# Patient Record
Sex: FEMALE | Race: WHITE | NOT HISPANIC OR LATINO | URBAN - METROPOLITAN AREA
[De-identification: names, ages, dates, MRNs, and addresses within clinical notes are randomized per-mention and may not be internally consistent; named-entity substitution may affect disease eponyms.]

---

## 2021-01-05 ENCOUNTER — LAB (OUTPATIENT)
Dept: LAB | Facility: HOSPITAL | Age: 61
End: 2021-01-05

## 2021-01-05 DIAGNOSIS — Z00.00 ROUTINE GENERAL MEDICAL EXAMINATION AT A HEALTH CARE FACILITY: Primary | ICD-10-CM

## 2021-01-05 LAB
ALBUMIN SERPL-MCNC: 4.2 G/DL (ref 3.5–5.2)
ALBUMIN/GLOB SERPL: 1.7 G/DL
ALP SERPL-CCNC: 93 U/L (ref 39–117)
ALT SERPL W P-5'-P-CCNC: 14 U/L (ref 1–33)
ANION GAP SERPL CALCULATED.3IONS-SCNC: 8.7 MMOL/L (ref 5–15)
AST SERPL-CCNC: 14 U/L (ref 1–32)
BILIRUB SERPL-MCNC: 0.4 MG/DL (ref 0–1.2)
BUN SERPL-MCNC: 13 MG/DL (ref 8–23)
BUN/CREAT SERPL: 27.1 (ref 7–25)
CALCIUM SPEC-SCNC: 9.2 MG/DL (ref 8.6–10.5)
CHLORIDE SERPL-SCNC: 104 MMOL/L (ref 98–107)
CHOLEST SERPL-MCNC: 203 MG/DL (ref 0–200)
CO2 SERPL-SCNC: 25.3 MMOL/L (ref 22–29)
CREAT SERPL-MCNC: 0.48 MG/DL (ref 0.57–1)
GFR SERPL CREATININE-BSD FRML MDRD: 132 ML/MIN/1.73
GLOBULIN UR ELPH-MCNC: 2.5 GM/DL
GLUCOSE SERPL-MCNC: 95 MG/DL (ref 65–99)
HDLC SERPL-MCNC: 26 MG/DL (ref 40–60)
LDLC SERPL CALC-MCNC: 101 MG/DL (ref 0–100)
LDLC/HDLC SERPL: 3.38 {RATIO}
POTASSIUM SERPL-SCNC: 4.1 MMOL/L (ref 3.5–5.2)
PROT SERPL-MCNC: 6.7 G/DL (ref 6–8.5)
SODIUM SERPL-SCNC: 138 MMOL/L (ref 136–145)
TRIGL SERPL-MCNC: 445 MG/DL (ref 0–150)
VLDLC SERPL-MCNC: 76 MG/DL (ref 5–40)

## 2021-01-05 PROCEDURE — 80053 COMPREHEN METABOLIC PANEL: CPT

## 2021-01-05 PROCEDURE — 36415 COLL VENOUS BLD VENIPUNCTURE: CPT

## 2021-01-05 PROCEDURE — 80061 LIPID PANEL: CPT

## 2021-07-21 ENCOUNTER — LAB (OUTPATIENT)
Dept: LAB | Facility: HOSPITAL | Age: 61
End: 2021-07-21

## 2021-07-21 ENCOUNTER — TRANSCRIBE ORDERS (OUTPATIENT)
Dept: LAB | Facility: HOSPITAL | Age: 61
End: 2021-07-21

## 2021-07-21 DIAGNOSIS — E78.5 HYPERLIPIDEMIA, UNSPECIFIED HYPERLIPIDEMIA TYPE: ICD-10-CM

## 2021-07-21 DIAGNOSIS — E78.5 HYPERLIPIDEMIA, UNSPECIFIED HYPERLIPIDEMIA TYPE: Primary | ICD-10-CM

## 2021-07-21 PROCEDURE — 36415 COLL VENOUS BLD VENIPUNCTURE: CPT

## 2021-08-03 ENCOUNTER — LAB (OUTPATIENT)
Dept: LAB | Facility: HOSPITAL | Age: 61
End: 2021-08-03

## 2021-08-03 ENCOUNTER — TRANSCRIBE ORDERS (OUTPATIENT)
Dept: LAB | Facility: HOSPITAL | Age: 61
End: 2021-08-03

## 2021-08-03 DIAGNOSIS — E78.5 HYPERLIPIDEMIA, UNSPECIFIED HYPERLIPIDEMIA TYPE: ICD-10-CM

## 2021-08-03 DIAGNOSIS — E78.5 HYPERLIPIDEMIA, UNSPECIFIED HYPERLIPIDEMIA TYPE: Primary | ICD-10-CM

## 2021-08-03 PROCEDURE — 36415 COLL VENOUS BLD VENIPUNCTURE: CPT

## 2021-08-03 PROCEDURE — 80061 LIPID PANEL: CPT

## 2021-08-03 PROCEDURE — 80053 COMPREHEN METABOLIC PANEL: CPT

## 2021-08-04 LAB
ALBUMIN SERPL-MCNC: 4.2 G/DL (ref 3.5–5.2)
ALBUMIN/GLOB SERPL: 1.5 G/DL
ALP SERPL-CCNC: 89 U/L (ref 39–117)
ALT SERPL W P-5'-P-CCNC: 10 U/L (ref 1–33)
ANION GAP SERPL CALCULATED.3IONS-SCNC: 9.7 MMOL/L (ref 5–15)
AST SERPL-CCNC: 11 U/L (ref 1–32)
BILIRUB SERPL-MCNC: 0.4 MG/DL (ref 0–1.2)
BUN SERPL-MCNC: 13 MG/DL (ref 8–23)
BUN/CREAT SERPL: 22 (ref 7–25)
CALCIUM SPEC-SCNC: 9.1 MG/DL (ref 8.6–10.5)
CHLORIDE SERPL-SCNC: 103 MMOL/L (ref 98–107)
CHOLEST SERPL-MCNC: 203 MG/DL (ref 0–200)
CO2 SERPL-SCNC: 26.3 MMOL/L (ref 22–29)
CREAT SERPL-MCNC: 0.59 MG/DL (ref 0.57–1)
GFR SERPL CREATININE-BSD FRML MDRD: 104 ML/MIN/1.73
GLOBULIN UR ELPH-MCNC: 2.8 GM/DL
GLUCOSE SERPL-MCNC: 96 MG/DL (ref 65–99)
HDLC SERPL-MCNC: 27 MG/DL (ref 40–60)
LDLC SERPL CALC-MCNC: 119 MG/DL (ref 0–100)
LDLC/HDLC SERPL: 4.1 {RATIO}
POTASSIUM SERPL-SCNC: 4.2 MMOL/L (ref 3.5–5.2)
PROT SERPL-MCNC: 7 G/DL (ref 6–8.5)
SODIUM SERPL-SCNC: 139 MMOL/L (ref 136–145)
TRIGL SERPL-MCNC: 326 MG/DL (ref 0–150)
VLDLC SERPL-MCNC: 57 MG/DL (ref 5–40)

## 2022-01-27 ENCOUNTER — LAB (OUTPATIENT)
Dept: LAB | Facility: HOSPITAL | Age: 62
End: 2022-01-27

## 2022-01-27 ENCOUNTER — TRANSCRIBE ORDERS (OUTPATIENT)
Dept: LAB | Facility: HOSPITAL | Age: 62
End: 2022-01-27

## 2022-01-27 DIAGNOSIS — E78.5 HYPERLIPIDEMIA, UNSPECIFIED HYPERLIPIDEMIA TYPE: Primary | ICD-10-CM

## 2022-01-27 DIAGNOSIS — E78.5 HYPERLIPIDEMIA, UNSPECIFIED HYPERLIPIDEMIA TYPE: ICD-10-CM

## 2022-01-27 PROCEDURE — 80061 LIPID PANEL: CPT

## 2022-01-27 PROCEDURE — 36415 COLL VENOUS BLD VENIPUNCTURE: CPT

## 2022-01-27 PROCEDURE — 80053 COMPREHEN METABOLIC PANEL: CPT

## 2022-01-28 LAB
ALBUMIN SERPL-MCNC: 4.1 G/DL (ref 3.5–5.2)
ALBUMIN/GLOB SERPL: 1.4 G/DL
ALP SERPL-CCNC: 106 U/L (ref 39–117)
ALT SERPL W P-5'-P-CCNC: 6 U/L (ref 1–33)
ANION GAP SERPL CALCULATED.3IONS-SCNC: 11 MMOL/L (ref 5–15)
AST SERPL-CCNC: 8 U/L (ref 1–32)
BILIRUB SERPL-MCNC: 0.3 MG/DL (ref 0–1.2)
BUN SERPL-MCNC: 12 MG/DL (ref 8–23)
BUN/CREAT SERPL: 28.6 (ref 7–25)
CALCIUM SPEC-SCNC: 9.2 MG/DL (ref 8.6–10.5)
CHLORIDE SERPL-SCNC: 100 MMOL/L (ref 98–107)
CHOLEST SERPL-MCNC: 206 MG/DL (ref 0–200)
CO2 SERPL-SCNC: 26 MMOL/L (ref 22–29)
CREAT SERPL-MCNC: 0.42 MG/DL (ref 0.57–1)
GFR SERPL CREATININE-BSD FRML MDRD: >150 ML/MIN/1.73
GLOBULIN UR ELPH-MCNC: 2.9 GM/DL
GLUCOSE SERPL-MCNC: 98 MG/DL (ref 65–99)
HDLC SERPL-MCNC: 26 MG/DL (ref 40–60)
LDLC SERPL CALC-MCNC: 116 MG/DL (ref 0–100)
LDLC/HDLC SERPL: 4.11 {RATIO}
POTASSIUM SERPL-SCNC: 4.1 MMOL/L (ref 3.5–5.2)
PROT SERPL-MCNC: 7 G/DL (ref 6–8.5)
SODIUM SERPL-SCNC: 137 MMOL/L (ref 136–145)
TRIGL SERPL-MCNC: 366 MG/DL (ref 0–150)
VLDLC SERPL-MCNC: 64 MG/DL (ref 5–40)

## 2023-11-19 ENCOUNTER — APPOINTMENT (OUTPATIENT)
Dept: GENERAL RADIOLOGY | Facility: HOSPITAL | Age: 63
End: 2023-11-19
Payer: COMMERCIAL

## 2023-11-19 ENCOUNTER — HOSPITAL ENCOUNTER (INPATIENT)
Facility: HOSPITAL | Age: 63
LOS: 2 days | Discharge: HOME | End: 2023-11-21
Attending: INTERNAL MEDICINE | Admitting: INTERNAL MEDICINE
Payer: COMMERCIAL

## 2023-11-19 ENCOUNTER — APPOINTMENT (OUTPATIENT)
Dept: CARDIOLOGY | Facility: HOSPITAL | Age: 63
End: 2023-11-19
Payer: COMMERCIAL

## 2023-11-19 PROBLEM — I10 PRIMARY HYPERTENSION: Status: ACTIVE | Noted: 2023-11-19

## 2023-11-19 PROBLEM — F41.9 ANXIETY: Status: ACTIVE | Noted: 2023-11-19

## 2023-11-19 PROBLEM — I25.708 CORONARY ARTERY DISEASE OF BYPASS GRAFT OF NATIVE HEART WITH STABLE ANGINA PECTORIS: Status: ACTIVE | Noted: 2023-11-19

## 2023-11-19 PROBLEM — I25.10 CAD (CORONARY ARTERY DISEASE), NATIVE CORONARY ARTERY: Status: ACTIVE | Noted: 2023-11-19

## 2023-11-19 PROBLEM — R19.7 ACUTE DIARRHEA: Status: ACTIVE | Noted: 2023-11-19

## 2023-11-19 PROBLEM — Z72.0 TOBACCO ABUSE: Status: ACTIVE | Noted: 2023-11-19

## 2023-11-19 PROBLEM — E78.2 MIXED HYPERLIPIDEMIA: Status: ACTIVE | Noted: 2023-11-19

## 2023-11-19 LAB
ALBUMIN SERPL-MCNC: 3.8 G/DL (ref 3.5–5.2)
ALBUMIN/GLOB SERPL: 1.3 G/DL
ALP SERPL-CCNC: 103 U/L (ref 39–117)
ALT SERPL W P-5'-P-CCNC: 9 U/L (ref 1–33)
ANION GAP SERPL CALCULATED.3IONS-SCNC: 12 MMOL/L (ref 5–15)
ANION GAP SERPL CALCULATED.3IONS-SCNC: 15 MMOL/L (ref 5–15)
APTT PPP: 164.8 SECONDS (ref 60–90)
AST SERPL-CCNC: 12 U/L (ref 1–32)
B PARAPERT DNA SPEC QL NAA+PROBE: NOT DETECTED
B PERT DNA SPEC QL NAA+PROBE: NOT DETECTED
BASOPHILS # BLD AUTO: 0.05 10*3/MM3 (ref 0–0.2)
BASOPHILS NFR BLD AUTO: 0.4 % (ref 0–1.5)
BILIRUB SERPL-MCNC: 0.6 MG/DL (ref 0–1.2)
BUN SERPL-MCNC: 10 MG/DL (ref 8–23)
BUN SERPL-MCNC: 11 MG/DL (ref 8–23)
BUN/CREAT SERPL: 18.5 (ref 7–25)
BUN/CREAT SERPL: 18.6 (ref 7–25)
C PNEUM DNA NPH QL NAA+NON-PROBE: NOT DETECTED
CALCIUM SPEC-SCNC: 9 MG/DL (ref 8.6–10.5)
CALCIUM SPEC-SCNC: 9.1 MG/DL (ref 8.6–10.5)
CHLORIDE SERPL-SCNC: 100 MMOL/L (ref 98–107)
CHLORIDE SERPL-SCNC: 101 MMOL/L (ref 98–107)
CHOLEST SERPL-MCNC: 195 MG/DL (ref 0–200)
CO2 SERPL-SCNC: 23 MMOL/L (ref 22–29)
CO2 SERPL-SCNC: 27 MMOL/L (ref 22–29)
CREAT SERPL-MCNC: 0.54 MG/DL (ref 0.57–1)
CREAT SERPL-MCNC: 0.59 MG/DL (ref 0.57–1)
DEPRECATED RDW RBC AUTO: 44.8 FL (ref 37–54)
EGFRCR SERPLBLD CKD-EPI 2021: 101.4 ML/MIN/1.73
EGFRCR SERPLBLD CKD-EPI 2021: 103.6 ML/MIN/1.73
EOSINOPHIL # BLD AUTO: 0.05 10*3/MM3 (ref 0–0.4)
EOSINOPHIL NFR BLD AUTO: 0.4 % (ref 0.3–6.2)
ERYTHROCYTE [DISTWIDTH] IN BLOOD BY AUTOMATED COUNT: 13.7 % (ref 12.3–15.4)
FLUAV SUBTYP SPEC NAA+PROBE: NOT DETECTED
FLUBV RNA ISLT QL NAA+PROBE: NOT DETECTED
GEN 5 2HR TROPONIN T REFLEX: 9 NG/L
GLOBULIN UR ELPH-MCNC: 3 GM/DL
GLUCOSE BLDC GLUCOMTR-MCNC: 109 MG/DL (ref 70–130)
GLUCOSE BLDC GLUCOMTR-MCNC: 131 MG/DL (ref 70–130)
GLUCOSE SERPL-MCNC: 168 MG/DL (ref 65–99)
GLUCOSE SERPL-MCNC: 239 MG/DL (ref 65–99)
HADV DNA SPEC NAA+PROBE: NOT DETECTED
HCOV 229E RNA SPEC QL NAA+PROBE: NOT DETECTED
HCOV HKU1 RNA SPEC QL NAA+PROBE: NOT DETECTED
HCOV NL63 RNA SPEC QL NAA+PROBE: NOT DETECTED
HCOV OC43 RNA SPEC QL NAA+PROBE: NOT DETECTED
HCT VFR BLD AUTO: 44.8 % (ref 34–46.6)
HDLC SERPL-MCNC: 29 MG/DL (ref 40–60)
HGB BLD-MCNC: 14.7 G/DL (ref 12–15.9)
HMPV RNA NPH QL NAA+NON-PROBE: NOT DETECTED
HPIV1 RNA ISLT QL NAA+PROBE: NOT DETECTED
HPIV2 RNA SPEC QL NAA+PROBE: NOT DETECTED
HPIV3 RNA NPH QL NAA+PROBE: NOT DETECTED
HPIV4 P GENE NPH QL NAA+PROBE: NOT DETECTED
IMM GRANULOCYTES # BLD AUTO: 0.04 10*3/MM3 (ref 0–0.05)
IMM GRANULOCYTES NFR BLD AUTO: 0.3 % (ref 0–0.5)
INR PPP: 1.14 (ref 0.89–1.12)
LDLC SERPL CALC-MCNC: 140 MG/DL (ref 0–100)
LDLC/HDLC SERPL: 4.74 {RATIO}
LYMPHOCYTES # BLD AUTO: 2.33 10*3/MM3 (ref 0.7–3.1)
LYMPHOCYTES NFR BLD AUTO: 17.2 % (ref 19.6–45.3)
M PNEUMO IGG SER IA-ACNC: NOT DETECTED
MAGNESIUM SERPL-MCNC: 1.7 MG/DL (ref 1.6–2.4)
MCH RBC QN AUTO: 29.5 PG (ref 26.6–33)
MCHC RBC AUTO-ENTMCNC: 32.8 G/DL (ref 31.5–35.7)
MCV RBC AUTO: 90 FL (ref 79–97)
MONOCYTES # BLD AUTO: 0.4 10*3/MM3 (ref 0.1–0.9)
MONOCYTES NFR BLD AUTO: 2.9 % (ref 5–12)
NEUTROPHILS NFR BLD AUTO: 10.69 10*3/MM3 (ref 1.7–7)
NEUTROPHILS NFR BLD AUTO: 78.8 % (ref 42.7–76)
NRBC BLD AUTO-RTO: 0 /100 WBC (ref 0–0.2)
PLATELET # BLD AUTO: 262 10*3/MM3 (ref 140–450)
PMV BLD AUTO: 12.2 FL (ref 6–12)
POTASSIUM SERPL-SCNC: 2.7 MMOL/L (ref 3.5–5.2)
POTASSIUM SERPL-SCNC: 3.3 MMOL/L (ref 3.5–5.2)
PROT SERPL-MCNC: 6.8 G/DL (ref 6–8.5)
PROTHROMBIN TIME: 14.8 SECONDS (ref 12.2–14.5)
RBC # BLD AUTO: 4.98 10*6/MM3 (ref 3.77–5.28)
RHINOVIRUS RNA SPEC NAA+PROBE: NOT DETECTED
RSV RNA NPH QL NAA+NON-PROBE: NOT DETECTED
SARS-COV-2 RNA NPH QL NAA+NON-PROBE: NOT DETECTED
SODIUM SERPL-SCNC: 139 MMOL/L (ref 136–145)
SODIUM SERPL-SCNC: 139 MMOL/L (ref 136–145)
TRIGL SERPL-MCNC: 143 MG/DL (ref 0–150)
TROPONIN T DELTA: 3 NG/L
TROPONIN T SERPL HS-MCNC: 6 NG/L
VLDLC SERPL-MCNC: 26 MG/DL (ref 5–40)
WBC NRBC COR # BLD AUTO: 13.56 10*3/MM3 (ref 3.4–10.8)

## 2023-11-19 PROCEDURE — C1769 GUIDE WIRE: HCPCS | Performed by: INTERNAL MEDICINE

## 2023-11-19 PROCEDURE — 93306 TTE W/DOPPLER COMPLETE: CPT | Performed by: INTERNAL MEDICINE

## 2023-11-19 PROCEDURE — C1887 CATHETER, GUIDING: HCPCS | Performed by: INTERNAL MEDICINE

## 2023-11-19 PROCEDURE — C1725 CATH, TRANSLUMIN NON-LASER: HCPCS | Performed by: INTERNAL MEDICINE

## 2023-11-19 PROCEDURE — 80061 LIPID PANEL: CPT | Performed by: INTERNAL MEDICINE

## 2023-11-19 PROCEDURE — 25510000001 IOPAMIDOL PER 1 ML: Performed by: INTERNAL MEDICINE

## 2023-11-19 PROCEDURE — 027034Z DILATION OF CORONARY ARTERY, ONE ARTERY WITH DRUG-ELUTING INTRALUMINAL DEVICE, PERCUTANEOUS APPROACH: ICD-10-PCS | Performed by: INTERNAL MEDICINE

## 2023-11-19 PROCEDURE — 93458 L HRT ARTERY/VENTRICLE ANGIO: CPT | Performed by: INTERNAL MEDICINE

## 2023-11-19 PROCEDURE — 84132 ASSAY OF SERUM POTASSIUM: CPT | Performed by: NURSE PRACTITIONER

## 2023-11-19 PROCEDURE — 80053 COMPREHEN METABOLIC PANEL: CPT | Performed by: INTERNAL MEDICINE

## 2023-11-19 PROCEDURE — 93005 ELECTROCARDIOGRAM TRACING: CPT | Performed by: INTERNAL MEDICINE

## 2023-11-19 PROCEDURE — 82948 REAGENT STRIP/BLOOD GLUCOSE: CPT

## 2023-11-19 PROCEDURE — 25010000002 MIDAZOLAM PER 1 MG: Performed by: INTERNAL MEDICINE

## 2023-11-19 PROCEDURE — 84484 ASSAY OF TROPONIN QUANT: CPT | Performed by: INTERNAL MEDICINE

## 2023-11-19 PROCEDURE — 99223 1ST HOSP IP/OBS HIGH 75: CPT | Performed by: INTERNAL MEDICINE

## 2023-11-19 PROCEDURE — 25010000002 LORAZEPAM PER 2 MG: Performed by: INTERNAL MEDICINE

## 2023-11-19 PROCEDURE — C9600 PERC DRUG-EL COR STENT SING: HCPCS | Performed by: INTERNAL MEDICINE

## 2023-11-19 PROCEDURE — 25010000002 FENTANYL CITRATE (PF) 50 MCG/ML SOLUTION: Performed by: INTERNAL MEDICINE

## 2023-11-19 PROCEDURE — 25810000003 SODIUM CHLORIDE 0.9 % SOLUTION: Performed by: INTERNAL MEDICINE

## 2023-11-19 PROCEDURE — 4A023N7 MEASUREMENT OF CARDIAC SAMPLING AND PRESSURE, LEFT HEART, PERCUTANEOUS APPROACH: ICD-10-PCS | Performed by: INTERNAL MEDICINE

## 2023-11-19 PROCEDURE — C1894 INTRO/SHEATH, NON-LASER: HCPCS | Performed by: INTERNAL MEDICINE

## 2023-11-19 PROCEDURE — 25810000003 SODIUM CHLORIDE 0.9 % SOLUTION: Performed by: NURSE PRACTITIONER

## 2023-11-19 PROCEDURE — 85025 COMPLETE CBC W/AUTO DIFF WBC: CPT | Performed by: INTERNAL MEDICINE

## 2023-11-19 PROCEDURE — 93306 TTE W/DOPPLER COMPLETE: CPT

## 2023-11-19 PROCEDURE — 25010000002 SULFUR HEXAFLUORIDE MICROSPH 60.7-25 MG RECONSTITUTED SUSPENSION: Performed by: INTERNAL MEDICINE

## 2023-11-19 PROCEDURE — 83735 ASSAY OF MAGNESIUM: CPT | Performed by: INTERNAL MEDICINE

## 2023-11-19 PROCEDURE — C1874 STENT, COATED/COV W/DEL SYS: HCPCS | Performed by: INTERNAL MEDICINE

## 2023-11-19 PROCEDURE — C1760 CLOSURE DEV, VASC: HCPCS | Performed by: INTERNAL MEDICINE

## 2023-11-19 PROCEDURE — 0202U NFCT DS 22 TRGT SARS-COV-2: CPT | Performed by: NURSE PRACTITIONER

## 2023-11-19 PROCEDURE — 25010000002 HEPARIN (PORCINE) PER 1000 UNITS: Performed by: INTERNAL MEDICINE

## 2023-11-19 PROCEDURE — 71045 X-RAY EXAM CHEST 1 VIEW: CPT

## 2023-11-19 PROCEDURE — B2111ZZ FLUOROSCOPY OF MULTIPLE CORONARY ARTERIES USING LOW OSMOLAR CONTRAST: ICD-10-PCS | Performed by: INTERNAL MEDICINE

## 2023-11-19 PROCEDURE — 99222 1ST HOSP IP/OBS MODERATE 55: CPT | Performed by: NURSE PRACTITIONER

## 2023-11-19 PROCEDURE — 92928 PRQ TCAT PLMT NTRAC ST 1 LES: CPT | Performed by: INTERNAL MEDICINE

## 2023-11-19 PROCEDURE — 85610 PROTHROMBIN TIME: CPT | Performed by: INTERNAL MEDICINE

## 2023-11-19 PROCEDURE — 85730 THROMBOPLASTIN TIME PARTIAL: CPT | Performed by: INTERNAL MEDICINE

## 2023-11-19 DEVICE — XIENCE SKYPOINT™ EVEROLIMUS ELUTING CORONARY STENT SYSTEM 4.00 MM X 23 MM / RAPID-EXCHANGE
Type: IMPLANTABLE DEVICE | Site: HEART | Status: FUNCTIONAL
Brand: XIENCE SKYPOINT™

## 2023-11-19 RX ORDER — SODIUM CHLORIDE 9 MG/ML
75 INJECTION, SOLUTION INTRAVENOUS CONTINUOUS
Status: DISCONTINUED | OUTPATIENT
Start: 2023-11-19 | End: 2023-11-21

## 2023-11-19 RX ORDER — NICARDIPINE HCL-0.9% SOD CHLOR 1 MG/10 ML
SYRINGE (ML) INTRAVENOUS
Status: DISCONTINUED | OUTPATIENT
Start: 2023-11-19 | End: 2023-11-19 | Stop reason: HOSPADM

## 2023-11-19 RX ORDER — MIDAZOLAM HYDROCHLORIDE 1 MG/ML
INJECTION INTRAMUSCULAR; INTRAVENOUS
Status: DISCONTINUED | OUTPATIENT
Start: 2023-11-19 | End: 2023-11-19 | Stop reason: HOSPADM

## 2023-11-19 RX ORDER — FENTANYL CITRATE 50 UG/ML
INJECTION, SOLUTION INTRAMUSCULAR; INTRAVENOUS
Status: DISCONTINUED | OUTPATIENT
Start: 2023-11-19 | End: 2023-11-19 | Stop reason: HOSPADM

## 2023-11-19 RX ORDER — METOPROLOL SUCCINATE 25 MG/1
25 TABLET, EXTENDED RELEASE ORAL DAILY
Status: ON HOLD | COMMUNITY
End: 2023-11-21 | Stop reason: SDUPTHER

## 2023-11-19 RX ORDER — HYDROXYZINE HYDROCHLORIDE 25 MG/1
25 TABLET, FILM COATED ORAL 3 TIMES DAILY PRN
Status: DISCONTINUED | OUTPATIENT
Start: 2023-11-19 | End: 2023-11-21 | Stop reason: HOSPADM

## 2023-11-19 RX ORDER — ACETAMINOPHEN 325 MG/1
650 TABLET ORAL EVERY 4 HOURS PRN
Status: DISCONTINUED | OUTPATIENT
Start: 2023-11-19 | End: 2023-11-21 | Stop reason: HOSPADM

## 2023-11-19 RX ORDER — INSULIN LISPRO 100 [IU]/ML
2-7 INJECTION, SOLUTION INTRAVENOUS; SUBCUTANEOUS
Status: DISCONTINUED | OUTPATIENT
Start: 2023-11-19 | End: 2023-11-21 | Stop reason: HOSPADM

## 2023-11-19 RX ORDER — CLOPIDOGREL BISULFATE 75 MG/1
75 TABLET ORAL DAILY
Status: DISCONTINUED | OUTPATIENT
Start: 2023-11-20 | End: 2023-11-21 | Stop reason: HOSPADM

## 2023-11-19 RX ORDER — LOSARTAN POTASSIUM 25 MG/1
25 TABLET ORAL DAILY
Status: DISCONTINUED | OUTPATIENT
Start: 2023-11-19 | End: 2023-11-21 | Stop reason: HOSPADM

## 2023-11-19 RX ORDER — IPRATROPIUM BROMIDE AND ALBUTEROL SULFATE 2.5; .5 MG/3ML; MG/3ML
3 SOLUTION RESPIRATORY (INHALATION) EVERY 4 HOURS PRN
Status: DISCONTINUED | OUTPATIENT
Start: 2023-11-19 | End: 2023-11-21 | Stop reason: HOSPADM

## 2023-11-19 RX ORDER — ALUMINA, MAGNESIA, AND SIMETHICONE 2400; 2400; 240 MG/30ML; MG/30ML; MG/30ML
15 SUSPENSION ORAL EVERY 6 HOURS PRN
Status: DISCONTINUED | OUTPATIENT
Start: 2023-11-19 | End: 2023-11-21 | Stop reason: HOSPADM

## 2023-11-19 RX ORDER — LORAZEPAM 2 MG/ML
INJECTION INTRAMUSCULAR
Status: DISCONTINUED | OUTPATIENT
Start: 2023-11-19 | End: 2023-11-19 | Stop reason: HOSPADM

## 2023-11-19 RX ORDER — CYCLOBENZAPRINE HCL 10 MG
10 TABLET ORAL ONCE
Status: COMPLETED | OUTPATIENT
Start: 2023-11-19 | End: 2023-11-19

## 2023-11-19 RX ORDER — IBUPROFEN 600 MG/1
1 TABLET ORAL
Status: DISCONTINUED | OUTPATIENT
Start: 2023-11-19 | End: 2023-11-21 | Stop reason: HOSPADM

## 2023-11-19 RX ORDER — NITROGLYCERIN 0.4 MG/1
0.4 TABLET SUBLINGUAL
Status: DISCONTINUED | OUTPATIENT
Start: 2023-11-19 | End: 2023-11-21 | Stop reason: HOSPADM

## 2023-11-19 RX ORDER — NICOTINE 21 MG/24HR
1 PATCH, TRANSDERMAL 24 HOURS TRANSDERMAL DAILY
Status: DISCONTINUED | OUTPATIENT
Start: 2023-11-19 | End: 2023-11-21 | Stop reason: HOSPADM

## 2023-11-19 RX ORDER — ROSUVASTATIN CALCIUM 20 MG/1
20 TABLET, COATED ORAL NIGHTLY
Status: DISCONTINUED | OUTPATIENT
Start: 2023-11-19 | End: 2023-11-21 | Stop reason: HOSPADM

## 2023-11-19 RX ORDER — ALPRAZOLAM 0.5 MG/1
0.5 TABLET ORAL 3 TIMES DAILY PRN
Status: DISCONTINUED | OUTPATIENT
Start: 2023-11-19 | End: 2023-11-21 | Stop reason: HOSPADM

## 2023-11-19 RX ORDER — NICOTINE POLACRILEX 4 MG
15 LOZENGE BUCCAL
Status: DISCONTINUED | OUTPATIENT
Start: 2023-11-19 | End: 2023-11-21 | Stop reason: HOSPADM

## 2023-11-19 RX ORDER — HYDROCODONE BITARTRATE AND ACETAMINOPHEN 7.5; 325 MG/1; MG/1
1 TABLET ORAL EVERY 4 HOURS PRN
Status: DISCONTINUED | OUTPATIENT
Start: 2023-11-19 | End: 2023-11-21 | Stop reason: HOSPADM

## 2023-11-19 RX ORDER — DEXTROSE MONOHYDRATE 25 G/50ML
25 INJECTION, SOLUTION INTRAVENOUS
Status: DISCONTINUED | OUTPATIENT
Start: 2023-11-19 | End: 2023-11-21 | Stop reason: HOSPADM

## 2023-11-19 RX ORDER — ASPIRIN 81 MG/1
81 TABLET ORAL DAILY
Status: DISCONTINUED | OUTPATIENT
Start: 2023-11-20 | End: 2023-11-21 | Stop reason: HOSPADM

## 2023-11-19 RX ORDER — HEPARIN SODIUM 1000 [USP'U]/ML
INJECTION, SOLUTION INTRAVENOUS; SUBCUTANEOUS
Status: DISCONTINUED | OUTPATIENT
Start: 2023-11-19 | End: 2023-11-19 | Stop reason: HOSPADM

## 2023-11-19 RX ORDER — SODIUM CHLORIDE 9 MG/ML
3 INJECTION, SOLUTION INTRAVENOUS CONTINUOUS
Status: ACTIVE | OUTPATIENT
Start: 2023-11-19 | End: 2023-11-19

## 2023-11-19 RX ORDER — LIDOCAINE HYDROCHLORIDE 10 MG/ML
INJECTION, SOLUTION EPIDURAL; INFILTRATION; INTRACAUDAL; PERINEURAL
Status: DISCONTINUED | OUTPATIENT
Start: 2023-11-19 | End: 2023-11-19 | Stop reason: HOSPADM

## 2023-11-19 RX ORDER — ONDANSETRON 2 MG/ML
4 INJECTION INTRAMUSCULAR; INTRAVENOUS EVERY 6 HOURS PRN
Status: DISCONTINUED | OUTPATIENT
Start: 2023-11-19 | End: 2023-11-21 | Stop reason: HOSPADM

## 2023-11-19 RX ORDER — ONDANSETRON 4 MG/1
4 TABLET, FILM COATED ORAL EVERY 6 HOURS PRN
Status: DISCONTINUED | OUTPATIENT
Start: 2023-11-19 | End: 2023-11-21 | Stop reason: HOSPADM

## 2023-11-19 RX ORDER — POTASSIUM CHLORIDE 750 MG/1
40 CAPSULE, EXTENDED RELEASE ORAL EVERY 4 HOURS
Status: COMPLETED | OUTPATIENT
Start: 2023-11-19 | End: 2023-11-19

## 2023-11-19 RX ORDER — ALBUTEROL SULFATE 90 UG/1
2 AEROSOL, METERED RESPIRATORY (INHALATION) EVERY 4 HOURS PRN
COMMUNITY

## 2023-11-19 RX ORDER — CLOPIDOGREL BISULFATE 75 MG/1
TABLET ORAL
Status: DISCONTINUED | OUTPATIENT
Start: 2023-11-19 | End: 2023-11-19 | Stop reason: HOSPADM

## 2023-11-19 RX ADMIN — METOPROLOL TARTRATE 25 MG: 25 TABLET, FILM COATED ORAL at 21:02

## 2023-11-19 RX ADMIN — POTASSIUM CHLORIDE 40 MEQ: 750 CAPSULE, EXTENDED RELEASE ORAL at 17:35

## 2023-11-19 RX ADMIN — ALPRAZOLAM 0.5 MG: 0.5 TABLET ORAL at 14:29

## 2023-11-19 RX ADMIN — POTASSIUM CHLORIDE 40 MEQ: 750 CAPSULE, EXTENDED RELEASE ORAL at 12:25

## 2023-11-19 RX ADMIN — ALPRAZOLAM 0.5 MG: 0.5 TABLET ORAL at 22:34

## 2023-11-19 RX ADMIN — METOPROLOL TARTRATE 25 MG: 25 TABLET, FILM COATED ORAL at 12:24

## 2023-11-19 RX ADMIN — POTASSIUM CHLORIDE 40 MEQ: 750 CAPSULE, EXTENDED RELEASE ORAL at 21:02

## 2023-11-19 RX ADMIN — LOSARTAN POTASSIUM 25 MG: 25 TABLET, FILM COATED ORAL at 12:24

## 2023-11-19 RX ADMIN — Medication 1 PATCH: at 12:25

## 2023-11-19 RX ADMIN — SULFUR HEXAFLUORIDE 5 ML: KIT at 15:58

## 2023-11-19 RX ADMIN — SODIUM CHLORIDE 75 ML/HR: 9 INJECTION, SOLUTION INTRAVENOUS at 15:17

## 2023-11-19 RX ADMIN — CYCLOBENZAPRINE 10 MG: 10 TABLET, FILM COATED ORAL at 17:35

## 2023-11-19 RX ADMIN — ROSUVASTATIN 20 MG: 20 TABLET, FILM COATED ORAL at 21:02

## 2023-11-19 RX ADMIN — SODIUM CHLORIDE 3 ML/KG/HR: 9 INJECTION, SOLUTION INTRAVENOUS at 12:22

## 2023-11-19 NOTE — Clinical Note
First balloon inflation max pressure = 12 jed. First balloon inflation duration = 15 seconds. Second inflation of balloon - Max pressure = 12 jed. 2nd Inflation of balloon - Duration = 15 seconds. 2nd inflation was done at 10:15 EST.

## 2023-11-19 NOTE — CONSULTS
[facility] Medicine Services  CONSULT NOTE      Patient Name: Fatemeh Hwoard  : 1960  MRN: [id number]    Primary Care Physician: Provider, No Known  Provider requesting consultation: Delia Milelr MD    Subjective   Subjective     Reason for Consultation:  Diarrhea, leukocytosis     HPI:  Fatemeh Howard is a 63 y.o. female with past medical history significant for HTN, HLD, anxiety, and tobacco abuse who was transferred from [facility] on 2023 due to concern for STEMI. Upon arrival to [facility], she underwent emergent cardiac catheterization found to have  of the codominant RCA with collaterals, proximal codominant circumflex tandem 80% stenoses s/p PCI of the proximal circumflex with 1 drug eluting stent. Hospital medicine was consulted to assist with medical management.    Patient was seen resting in bed following heart catheterization. She reports a history of IBS with predominate diarrhea. She reports diarrhea has been worse for the past week since her chest started hurting. She denies any recent fever, chills, or abdominal pain. She does endorse increased anxiety. She denies known history of diabetes. No additional concerns at  this time.     Personal History     History reviewed. No pertinent past medical history.    History reviewed. No pertinent surgical history.    Family History: family history is not on file.     Social History:      Medications:  albuterol sulfate HFA and metoprolol succinate XL    Scheduled Meds:[START ON 2023] aspirin, 81 mg, Oral, Daily  [START ON 2023] clopidogrel, 75 mg, Oral, Daily  insulin lispro, 2-7 Units, Subcutaneous, 4x Daily AC & at Bedtime  losartan, 25 mg, Oral, Daily  metoprolol tartrate, 25 mg, Oral, Q12H  nicotine, 1 patch, Transdermal, Daily  pharmacy consult - [place], , Does not apply, Daily  potassium chloride, 40 mEq, Oral, Q4H  rosuvastatin, 20 mg, Oral, Nightly      Continuous Infusions:sodium chloride, 3 mL/kg/hr,  Last Rate: 3 mL/kg/hr (11/19/23 1222)      PRN Meds:.  acetaminophen    ALPRAZolam    aluminum-magnesium hydroxide-simethicone    Calcium Replacement - Follow Nurse / BPA Driven Protocol    dextrose    dextrose    glucagon (human recombinant)    HYDROcodone-acetaminophen    hydrOXYzine    Magnesium Standard Dose Replacement - Follow Nurse / BPA Driven Protocol    nicotine polacrilex    nitroglycerin    ondansetron **OR** ondansetron    Phosphorus Replacement - Follow Nurse / BPA Driven Protocol    Potassium Replacement - Follow Nurse / BPA Driven Protocol    No Known Allergies    Objective   Objective     Vital Signs:   Heart Rate:  [81-84] 84  Resp:  [18] 18  BP: (153-164)/() 161/106  Flow (L/min):  [4] 4    Physical Exam  Constitutional: Awake, alert, chronically ill appearing  Eyes: PERRLA, sclerae anicteric, no conjunctival injection  HENT: NCAT, mucous membranes moist  Neck: Supple, no thyromegaly, no lymphadenopathy, trachea midline  Respiratory: grossly clear, diminished in bases, nonlabored respirations on 4L NC  Cardiovascular: RRR, no murmurs, rubs, or gallops, palpable pedal pulses bilaterally  Gastrointestinal: Positive bowel sounds, soft, nontender, nondistended  Musculoskeletal: No bilateral ankle edema, no clubbing or cyanosis to extremities  Psychiatric: Anxious affect, cooperative  Neurologic: Oriented x 3, moves all extremities, Cranial Nerves grossly intact to confrontation, speech clear  Skin: No rashes, right radial site with no hematoma     Result Review:  I have personally reviewed the results from the time of admission and agree with these findings:  [x]  Laboratory  [x]  Radiology  []  EKG/Telemetry   []  Pathology  [x]  Old records  []  Other:  Most notable findings include:    LAB RESULTS:      Lab 11/19/23  1002   WBC 13.56*   HEMOGLOBIN 14.7   HEMATOCRIT 44.8   PLATELETS 262   NEUTROS ABS 10.69*   IMMATURE GRANS (ABS) 0.04   LYMPHS ABS 2.33   MONOS ABS 0.40   EOS ABS 0.05   MCV  90.0         Lab 11/19/23  1002   SODIUM 139   POTASSIUM 2.7*   CHLORIDE 101   CO2 23.0   ANION GAP 15.0   BUN 11   CREATININE 0.59   EGFR 101.4   GLUCOSE 239*   CALCIUM 9.0   MAGNESIUM 1.7         Lab 11/19/23  1002   TOTAL PROTEIN 6.8   ALBUMIN 3.8   GLOBULIN 3.0   ALT (SGPT) 9   AST (SGOT) 12   BILIRUBIN 0.6   ALK PHOS 103         Lab 11/19/23  1002   HSTROP T 6         Lab 11/19/23  1002   CHOLESTEROL 195   LDL CHOL 140*   HDL CHOL 29*   TRIGLYCERIDES 143             Brief Urine Lab Results       None          Microbiology Results (last 10 days)       ** No results found for the last 240 hours. **            Cardiac Catheterization/Vascular Study    Result Date: 11/19/2023    False STEMI, 100%  of the medium caliber codominant RCA with mature left-to-right collaterals   Codominant large circumflex tandem 80% stenoses of the proximal and mid status post PCI with a 4.0 x 23 mm Xience BERENICE.   Scattered mid to distal LAD 10-20%.  Normal left main.   LV pressures (S/D/E) : 138/-4/10 mmHg   The ejection fraction was greater than 55% by visual estimate.  With akinesis of the basal inferior segment.   Mild aortic stenosis may be present with a peak to peak gradient of 35 mmHg, we will check an echocardiogram Aspirin 81 mg for life, clopidogrel 75 mg daily for no less than 6 months          Assessment & Plan   Assessment & Plan     Active Hospital Problems    Diagnosis  POA    **Coronary artery disease of bypass graft of native heart with stable angina pectoris [I25.708]  Unknown    Anxiety [F41.9]  Unknown    Acute diarrhea [R19.7]  Unknown    Primary hypertension [I10]  Unknown    Tobacco abuse [Z72.0]  Unknown    Mixed hyperlipidemia [E78.2]  Unknown    CAD (coronary artery disease), native coronary artery [I25.10]  Yes      Resolved Hospital Problems   No resolved problems to display.     Fatemeh Howard is a 63 y.o. female with past medical history significant for HTN, HLD, anxiety, and tobacco abuse who was  transferred from [facility] on 11/19/2023 due to concern for STEMI. Hospital medicine was consulted to assist with medical management.     CAD  -s/p LHC with BERENICE to circumflex  -Management per primary    Acute hypoxic respiratory failure   -SpO2 86% on RA, does not use O2 at home  -Respiratory PCR ordered  -CXR pending  -Currently on 4L NC, wean as tolerated  -IS, Pulm toilet  -DuoNebs PRN     Leukocytosis  Diarrhea  Hx IBS   -WBC 13.56 on presentation  -GI PCR, Cdiff pending  -UA pending   -NS at 75 ml/hr  -Afebrile  -AM labs     Hypokalemia  -Replace per protocol     Hyperglycemia   - on presentation  -Hgb A1C in AM   -Low dose SSI for now    Anxiety  -Xanax, Atarax PRN   -May benefit from MH referral at d/c    Thank you for allowing [facility] Medicine Service to provide consultative care for your patient, we will continue to follow while clinically appropriate.    Angelo Pettit, APRN  11/19/23

## 2023-11-19 NOTE — Clinical Note
First balloon inflation max pressure = 15 jed. First balloon inflation duration = 10 seconds. Second inflation of balloon - Max pressure = 18 jed. 2nd Inflation of balloon - Duration = 10 seconds. 2nd inflation was done at 10:29 EST. Third inflation of balloon - Max pressure = 18 jed. 3rd Inflation of balloon - Duration = 10 seconds. 3rd inflation was done at 10:29 EST.

## 2023-11-19 NOTE — H&P
[facility] at [facility]  Cardiology H&P Note  [facility] CATH LAB          Patient Identification:  Fatemeh Howard      [id number]  63 y.o.        female  1960       Date of Admission:  11/19/23    Chief complaint: Palpitations    PCP: Provider, No Known  Primary cardiologist: None    History of Present Illness:     63-year-old female with history of tobacco abuse, hypertension, hyperlipidemia, anxiety, presenting from [facility] where she presented with tachycardia and palpitations with shortness of breath for the last 3 days, initial EKG showed inferior ST elevation with no reciprocal changes, she presented to [facility] Lab underwent emergent cardiac catheterization found to have  of the codominant RCA with collaterals, proximal codominant circumflex tandem 80% stenoses status post PCI of the proximal circumflex with 1 drug-eluting stent, relatively normal LAD, LVEDP 10 mmHg with EF 50 to 55% and basal inferior akinesis.    Past History:  No past medical history on file.  No past surgical history on file.  Not on File  Social History     Socioeconomic History    Marital status: Unknown     No family history on file.  Medications:  No medications prior to admission.     Current medications:    Current IV drips:         Review of Systems:    Constitutional no fever,  no weight loss   Skin no rash   Otolaryngeal no difficulty swallowing   Cardiovascular See HPI   Pulmonary no cough, no sputum production   Gastrointestinal no constipation, positive for diarrhea   Genitourinary no dysuria, no hematuria   Hematologic no easy bruisability, no abnormal bleeding   Musculoskeletal no muscle pain   Neurologic no dizziness, no falls     Physical exam:    /94   Pulse 84   Resp 18   Wt 86.2 kg (190 lb)   SpO2 94%  There is no height or weight on file to calculate BMI.   Oxygen saturation   SpO2  Min: 91 %  Max: 94 %    General Appearance:   well developed  well  "nourished  HENT:   oropharynx moist  lips not cyanotic  Neck:  thyroid not enlarged  supple  Respiratory:  no respiratory distress  normal breath sounds  no rales  Cardiovascular:  no jugular venous distention  regular rhythm  apical impulse normal  S1 normal, S2 normal  no S3, no S4   no murmur  no rub, no thrill  carotid pulses normal; no bruit  pedal pulses normal  lower extremity edema: none    Gastrointestinal:   bowel sounds normal  non-tender  no hepatomegaly, no splenomegaly  Musculoskeletal:  no clubbing of fingers.   normocephalic, head atraumatic  Skin:   warm, dry  Psychiatric:  judgement and insight appropriate  normal mood and affect    Cardiographics:     ECG  (personally reviewed) sinus tachycardia with inferior J-point elevation   Telemetry:  (personally reviewed) sinus rhythm   ECHO:        CATH: Findings as above   CARDIOLITE: None    Lab Review:       Results from last 7 days   Lab Units 11/19/23  1002   WBC 10*3/mm3 13.56*   HEMOGLOBIN g/dL 14.7   HEMATOCRIT % 44.8            Results from last 7 days   Lab Units 11/19/23  1002   SODIUM mmol/L 139   BUN mg/dL 11   CREATININE mg/dL 0.59   GLUCOSE mg/dL 239*      CrCl cannot be calculated (Unknown ideal weight.).     Lab Results   Component Value Date    CHOL 195 11/19/2023    TRIG 143 11/19/2023    HDL 29 (L) 11/19/2023     (H) 11/19/2023    AST 12 11/19/2023    ALT 9 11/19/2023           No results found for: \"TSH\"   No results found for: \"HGBA1C\"        No results found for: \"DIGOXIN\"   No results found for: \"DDIMERQUANT\"     Imaging:  All pertinent imaging studies were personally reviewed.    Assessment:      Coronary artery disease of bypass graft of native heart with stable angina pectoris    Anxiety    Acute diarrhea    Primary hypertension    Tobacco abuse    Mixed hyperlipidemia          Recommendations:  1.  CAD:  Not a STEMI  Status post PCI of the proximal to mid circumflex with a 4.0 drug-eluting stent  Aspirin 81 mg for " life  Clopidogrel 75 mg daily for 6 months  High intensity statin therapy will be initiated  Beta-blocker will be initiated  Tobacco cessation recommended as well as cardiac rehab    2.  Tobacco abuse:  Discussed the importance complete tobacco cessation, assistance will be offered.    3.  Hypertension:  Goal blood pressure less than 130/80  We will adjust home medications and add beta-blocker    4.  Hyperlipidemia:  Goal LDL less than 50  High intensity statin therapy initiated    5.  Diarrhea:  Consult hospitalist for appropriate work-up  She also has mild leukocytosis.        Discussed with patient's nurse, and  patient's family        Chaitanya Linn MD  11/19/2023  11:01 EST

## 2023-11-19 NOTE — Clinical Note
Allergies reviewed.  Patient does not have a current H&P in the chart. Procedural consent has not been signed. Staff has reviewed the patient's labs.

## 2023-11-19 NOTE — Clinical Note
catheter removed. [No Acute Distress] : no acute distress [Normal Oropharynx] : normal oropharynx [Normal Appearance] : normal appearance [No Neck Mass] : no neck mass [Normal Rate/Rhythm] : normal rate/rhythm [Normal S1, S2] : normal s1, s2 [No Murmurs] : no murmurs [No Resp Distress] : no resp distress [Clear to Auscultation Bilaterally] : clear to auscultation bilaterally [No Abnormalities] : no abnormalities [Benign] : benign [Normal Gait] : normal gait [No Clubbing] : no clubbing [No Cyanosis] : no cyanosis [No Edema] : no edema [FROM] : FROM [Normal Color/ Pigmentation] : normal color/ pigmentation [No Focal Deficits] : no focal deficits [Oriented x3] : oriented x3 [Normal Affect] : normal affect

## 2023-11-20 LAB
ANION GAP SERPL CALCULATED.3IONS-SCNC: 7 MMOL/L (ref 5–15)
BACTERIA UR QL AUTO: ABNORMAL /HPF
BH CV ECHO MEAS - AO MAX PG: 6.9 MMHG
BH CV ECHO MEAS - AO MEAN PG: 4 MMHG
BH CV ECHO MEAS - AO ROOT DIAM: 2.8 CM
BH CV ECHO MEAS - AO V2 MAX: 131 CM/SEC
BH CV ECHO MEAS - AO V2 VTI: 23.3 CM
BH CV ECHO MEAS - AVA(I,D): 2.06 CM2
BH CV ECHO MEAS - CONTRAST EF (2CH): 38.8 CM2
BH CV ECHO MEAS - CONTRAST EF 4CH: 35.4 CM2
BH CV ECHO MEAS - EDV(CUBED): 91.1 ML
BH CV ECHO MEAS - EDV(MOD-SP2): 129 ML
BH CV ECHO MEAS - EDV(MOD-SP4): 134 ML
BH CV ECHO MEAS - EF(MOD-BP): 37.9 %
BH CV ECHO MEAS - EF(MOD-SP2): 54.1 %
BH CV ECHO MEAS - EF(MOD-SP4): 36.8 %
BH CV ECHO MEAS - ESV(CUBED): 27 ML
BH CV ECHO MEAS - ESV(MOD-SP2): 79 ML
BH CV ECHO MEAS - ESV(MOD-SP4): 86.5 ML
BH CV ECHO MEAS - FS: 33.3 %
BH CV ECHO MEAS - IVS/LVPW: 1.15 CM
BH CV ECHO MEAS - IVSD: 1.5 CM
BH CV ECHO MEAS - LA DIMENSION: 4 CM
BH CV ECHO MEAS - LAT PEAK E' VEL: 2.9 CM/SEC
BH CV ECHO MEAS - LV DIASTOLIC VOL/BSA (35-75): 68.7 CM2
BH CV ECHO MEAS - LV MASS(C)D: 248.4 GRAMS
BH CV ECHO MEAS - LV MAX PG: 2.5 MMHG
BH CV ECHO MEAS - LV MEAN PG: 1 MMHG
BH CV ECHO MEAS - LV SYSTOLIC VOL/BSA (12-30): 44.4 CM2
BH CV ECHO MEAS - LV V1 MAX: 79.4 CM/SEC
BH CV ECHO MEAS - LV V1 VTI: 15.3 CM
BH CV ECHO MEAS - LVIDD: 4.5 CM
BH CV ECHO MEAS - LVIDS: 3 CM
BH CV ECHO MEAS - LVOT AREA: 3.1 CM2
BH CV ECHO MEAS - LVOT DIAM: 2 CM
BH CV ECHO MEAS - LVPWD: 1.3 CM
BH CV ECHO MEAS - MED PEAK E' VEL: 3.4 CM/SEC
BH CV ECHO MEAS - MV A MAX VEL: 92.2 CM/SEC
BH CV ECHO MEAS - MV DEC TIME: 0.24 SEC
BH CV ECHO MEAS - MV E MAX VEL: 48.7 CM/SEC
BH CV ECHO MEAS - MV E/A: 0.53
BH CV ECHO MEAS - MV MAX PG: 5.2 MMHG
BH CV ECHO MEAS - MV MEAN PG: 2 MMHG
BH CV ECHO MEAS - MV V2 VTI: 22.8 CM
BH CV ECHO MEAS - MVA(VTI): 2.1 CM2
BH CV ECHO MEAS - PA ACC TIME: 0.17 SEC
BH CV ECHO MEAS - PA V2 MAX: 102.6 CM/SEC
BH CV ECHO MEAS - RAP SYSTOLE: 3 MMHG
BH CV ECHO MEAS - RVSP: 9 MMHG
BH CV ECHO MEAS - SI(MOD-SP2): 25.6 ML/M2
BH CV ECHO MEAS - SI(MOD-SP4): 24.4 ML/M2
BH CV ECHO MEAS - SV(LVOT): 47.9 ML
BH CV ECHO MEAS - SV(MOD-SP2): 50 ML
BH CV ECHO MEAS - SV(MOD-SP4): 47.5 ML
BH CV ECHO MEAS - TAPSE (>1.6): 1.82 CM
BH CV ECHO MEAS - TR MAX PG: 6.3 MMHG
BH CV ECHO MEAS - TR MAX VEL: 125 CM/SEC
BH CV ECHO MEASUREMENTS AVERAGE E/E' RATIO: 15.46
BH CV XLRA - RV BASE: 2.3 CM
BH CV XLRA - RV LENGTH: 7.6 CM
BH CV XLRA - RV MID: 2.1 CM
BH CV XLRA - TDI S': 13.2 CM/SEC
BILIRUB UR QL STRIP: NEGATIVE
BUN SERPL-MCNC: 7 MG/DL (ref 8–23)
BUN/CREAT SERPL: 15.2 (ref 7–25)
CALCIUM SPEC-SCNC: 9.3 MG/DL (ref 8.6–10.5)
CHLORIDE SERPL-SCNC: 100 MMOL/L (ref 98–107)
CLARITY UR: CLEAR
CO2 SERPL-SCNC: 25 MMOL/L (ref 22–29)
COLOR UR: YELLOW
CREAT SERPL-MCNC: 0.46 MG/DL (ref 0.57–1)
EGFRCR SERPLBLD CKD-EPI 2021: 107.7 ML/MIN/1.73
GLUCOSE BLDC GLUCOMTR-MCNC: 103 MG/DL (ref 70–130)
GLUCOSE BLDC GLUCOMTR-MCNC: 113 MG/DL (ref 70–130)
GLUCOSE BLDC GLUCOMTR-MCNC: 135 MG/DL (ref 70–130)
GLUCOSE BLDC GLUCOMTR-MCNC: 98 MG/DL (ref 70–130)
GLUCOSE SERPL-MCNC: 135 MG/DL (ref 65–99)
GLUCOSE UR STRIP-MCNC: NEGATIVE MG/DL
HBA1C MFR BLD: 5.6 % (ref 4.8–5.6)
HGB UR QL STRIP.AUTO: ABNORMAL
HYALINE CASTS UR QL AUTO: ABNORMAL /LPF
KETONES UR QL STRIP: NEGATIVE
LEFT ATRIUM VOLUME INDEX: 15.5 ML/M2
LEUKOCYTE ESTERASE UR QL STRIP.AUTO: NEGATIVE
NITRITE UR QL STRIP: NEGATIVE
PA ADP PRP-ACNC: 144 PRU
PH UR STRIP.AUTO: 6.5 [PH] (ref 5–8)
POTASSIUM SERPL-SCNC: 4.3 MMOL/L (ref 3.5–5.2)
POTASSIUM SERPL-SCNC: 4.3 MMOL/L (ref 3.5–5.2)
PROT UR QL STRIP: NEGATIVE
RBC # UR STRIP: ABNORMAL /HPF
REF LAB TEST METHOD: ABNORMAL
SODIUM SERPL-SCNC: 132 MMOL/L (ref 136–145)
SP GR UR STRIP: <=1.005 (ref 1–1.03)
SQUAMOUS #/AREA URNS HPF: ABNORMAL /HPF
TROPONIN T SERPL HS-MCNC: 1988 NG/L
UROBILINOGEN UR QL STRIP: ABNORMAL
WBC # UR STRIP: ABNORMAL /HPF

## 2023-11-20 PROCEDURE — 25810000003 SODIUM CHLORIDE 0.9 % SOLUTION: Performed by: NURSE PRACTITIONER

## 2023-11-20 PROCEDURE — 80048 BASIC METABOLIC PNL TOTAL CA: CPT | Performed by: NURSE PRACTITIONER

## 2023-11-20 PROCEDURE — 85576 BLOOD PLATELET AGGREGATION: CPT | Performed by: NURSE PRACTITIONER

## 2023-11-20 PROCEDURE — 99232 SBSQ HOSP IP/OBS MODERATE 35: CPT | Performed by: STUDENT IN AN ORGANIZED HEALTH CARE EDUCATION/TRAINING PROGRAM

## 2023-11-20 PROCEDURE — 25010000002 HYDRALAZINE PER 20 MG: Performed by: NURSE PRACTITIONER

## 2023-11-20 PROCEDURE — 83036 HEMOGLOBIN GLYCOSYLATED A1C: CPT | Performed by: NURSE PRACTITIONER

## 2023-11-20 PROCEDURE — 82948 REAGENT STRIP/BLOOD GLUCOSE: CPT

## 2023-11-20 PROCEDURE — 99232 SBSQ HOSP IP/OBS MODERATE 35: CPT

## 2023-11-20 PROCEDURE — 81001 URINALYSIS AUTO W/SCOPE: CPT | Performed by: NURSE PRACTITIONER

## 2023-11-20 PROCEDURE — 84484 ASSAY OF TROPONIN QUANT: CPT | Performed by: INTERNAL MEDICINE

## 2023-11-20 RX ORDER — CITALOPRAM 20 MG/1
20 TABLET ORAL DAILY
COMMUNITY

## 2023-11-20 RX ORDER — FLUTICASONE PROPIONATE 50 MCG
2 SPRAY, SUSPENSION (ML) NASAL DAILY
Status: DISCONTINUED | OUTPATIENT
Start: 2023-11-20 | End: 2023-11-21 | Stop reason: HOSPADM

## 2023-11-20 RX ORDER — HYDRALAZINE HYDROCHLORIDE 20 MG/ML
10 INJECTION INTRAMUSCULAR; INTRAVENOUS EVERY 6 HOURS PRN
Status: DISCONTINUED | OUTPATIENT
Start: 2023-11-20 | End: 2023-11-21 | Stop reason: HOSPADM

## 2023-11-20 RX ORDER — BUSPIRONE HYDROCHLORIDE 5 MG/1
5 TABLET ORAL 2 TIMES DAILY
COMMUNITY

## 2023-11-20 RX ORDER — PANTOPRAZOLE SODIUM 40 MG/1
40 TABLET, DELAYED RELEASE ORAL
Status: DISCONTINUED | OUTPATIENT
Start: 2023-11-20 | End: 2023-11-21 | Stop reason: HOSPADM

## 2023-11-20 RX ORDER — FLUTICASONE PROPIONATE AND SALMETEROL 100; 50 UG/1; UG/1
1 POWDER RESPIRATORY (INHALATION) 2 TIMES DAILY PRN
COMMUNITY

## 2023-11-20 RX ORDER — LISINOPRIL 20 MG/1
20 TABLET ORAL DAILY
COMMUNITY
End: 2023-11-22 | Stop reason: SDUPTHER

## 2023-11-20 RX ADMIN — Medication 1 PATCH: at 09:43

## 2023-11-20 RX ADMIN — METOPROLOL TARTRATE 25 MG: 25 TABLET, FILM COATED ORAL at 09:40

## 2023-11-20 RX ADMIN — FLUTICASONE PROPIONATE 2 SPRAY: 50 SPRAY, METERED NASAL at 17:57

## 2023-11-20 RX ADMIN — METOPROLOL TARTRATE 25 MG: 25 TABLET, FILM COATED ORAL at 21:49

## 2023-11-20 RX ADMIN — CLOPIDOGREL BISULFATE 75 MG: 75 TABLET ORAL at 09:40

## 2023-11-20 RX ADMIN — SODIUM CHLORIDE 75 ML/HR: 9 INJECTION, SOLUTION INTRAVENOUS at 21:52

## 2023-11-20 RX ADMIN — ASPIRIN 81 MG: 81 TABLET, COATED ORAL at 09:40

## 2023-11-20 RX ADMIN — ROSUVASTATIN 20 MG: 20 TABLET, FILM COATED ORAL at 21:49

## 2023-11-20 RX ADMIN — HYDROXYZINE HYDROCHLORIDE 25 MG: 25 TABLET, FILM COATED ORAL at 09:55

## 2023-11-20 RX ADMIN — LOSARTAN POTASSIUM 25 MG: 25 TABLET, FILM COATED ORAL at 09:40

## 2023-11-20 RX ADMIN — HYDRALAZINE HYDROCHLORIDE 10 MG: 20 INJECTION INTRAMUSCULAR; INTRAVENOUS at 02:22

## 2023-11-20 RX ADMIN — METOPROLOL TARTRATE 25 MG: 25 TABLET, FILM COATED ORAL at 02:22

## 2023-11-20 NOTE — PROGRESS NOTES
"  [facility] at [facility]  PROGRESS NOTE    Date of Admission: 11/19/2023  Date of Service: 11/20/23    Primary Care Physician: Provider, No Known    Chief Complaint:  follow up CAD   Problem List:   Coronary artery disease of bypass graft of native heart with stable angina pectoris    Anxiety    Acute diarrhea    Primary hypertension    Tobacco abuse    Mixed hyperlipidemia    CAD (coronary artery disease), native coronary artery      Subjective      No acute events overnight. No angina or CHF symptoms. Continues to have belching and diarrhea. BP well controlled. Radial access site CDI.        Objective   Vitals: /82 (BP Location: Left arm, Patient Position: Lying)   Pulse 92   Temp 97.8 °F (36.6 °C) (Oral)   Resp 20   Ht 167 cm (65.75\")   Wt 86 kg (189 lb 9.5 oz)   SpO2 94%   BMI 30.84 kg/m²     Physical Exam:  GENERAL:  in no acute distress.         HEENT: no jugular venous distention  HEART: Regular rhythm, normal rate, and no murmurs, gallops, or rubs.   LUNGS: Clear to auscultation bilaterally. No wheezing, rales or rhonchi.  EXTREMITIES: No clubbing, cyanosis, or edema noted. Radial access site CDI     Results:  Results from last 7 days   Lab Units 11/19/23  1002   WBC 10*3/mm3 13.56*   HEMOGLOBIN g/dL 14.7   HEMATOCRIT % 44.8   PLATELETS 10*3/mm3 262     Results from last 7 days   Lab Units 11/20/23  0521 11/19/23  2336 11/19/23  1217 11/19/23  1002   SODIUM mmol/L 132*  --  139 139   POTASSIUM mmol/L 4.3 4.3 3.3* 2.7*   CHLORIDE mmol/L 100  --  100 101   CO2 mmol/L 25.0  --  27.0 23.0   BUN mg/dL 7*  --  10 11   CREATININE mg/dL 0.46*  --  0.54* 0.59   GLUCOSE mg/dL 135*  --  168* 239*      Lab Results   Component Value Date    CHOL 195 11/19/2023    TRIG 143 11/19/2023    HDL 29 (L) 11/19/2023     (H) 11/19/2023    AST 12 11/19/2023    ALT 9 11/19/2023     Results from last 7 days   Lab Units 11/20/23  0521   HEMOGLOBIN A1C % 5.60     Results from last 7 days "   Lab Units 11/19/23  1002   CHOLESTEROL mg/dL 195   TRIGLYCERIDES mg/dL 143   HDL CHOL mg/dL 29*   LDL CHOL mg/dL 140*             Results from last 7 days   Lab Units 11/19/23  1217   PROTIME Seconds 14.8*   INR  1.14*   APTT seconds 164.8*     Results from last 7 days   Lab Units 11/20/23  0521 11/19/23  1217 11/19/23  1002   HSTROP T ng/L 1,988* 9 6             Intake/Output Summary (Last 24 hours) at 11/20/2023 1131  Last data filed at 11/20/2023 0325  Gross per 24 hour   Intake --   Output 1250 ml   Net -1250 ml       I personally reviewed the patient's EKG/Telemetry data      Current Medications:  aspirin, 81 mg, Oral, Daily  clopidogrel, 75 mg, Oral, Daily  insulin lispro, 2-7 Units, Subcutaneous, 4x Daily AC & at Bedtime  losartan, 25 mg, Oral, Daily  metoprolol tartrate, 25 mg, Oral, Q12H  nicotine, 1 patch, Transdermal, Daily  pantoprazole, 40 mg, Oral, Q AM  pharmacy consult - MTM, , Does not apply, Daily  rosuvastatin, 20 mg, Oral, Nightly      sodium chloride, 75 mL/hr, Last Rate: 75 mL/hr (11/19/23 1517)        Assessment and Plan:   1.  CAD:  Not a STEMI  Status post PCI of the proximal to mid circumflex with a 4.0 drug-eluting stent  Aspirin 81 mg for life  Clopidogrel 75 mg daily for 6 months  High intensity statin therapy initiated  Metoprolol 25mg BID started yesterday.  Tobacco cessation recommended as well as cardiac rehab  Echocardiogram is pending.      2.  Tobacco abuse:  Discussed the importance complete tobacco cessation, assistance will be offered.     3.  Hypertension:  Goal blood pressure less than 130/80  Continue losartan 25mg daily and lopressor 25mg BID.      4.  Hyperlipidemia:  Goal LDL less than 50  Crestor 20mg daily initiated this admission     5.  Diarrhea and belching:  hospitalist following for appropriate work-up  She also has mild leukocytosis.  Add protonix 40mg daily.   Replace potassium per protocol.         DC home when all agree. Follow up with Dr. Ray in 6 weeks  in our [place] office.

## 2023-11-20 NOTE — PROGRESS NOTES
[facility] Medicine Services  PROGRESS NOTE    Patient Name: Fatemeh Howard  : 1960  MRN: [id number]    Date of Admission: 2023  Primary Care Physician: Provider, No Known    Subjective   Subjective     CC:  F/u diarrhea    HPI:  Still having diarrhea. States she has diarrhea when her anxiety flares up. Long discussion abt her underlying anxiety issues      Objective   Objective     Vital Signs:   Temp:  [97.8 °F (36.6 °C)-98.7 °F (37.1 °C)] 97.8 °F (36.6 °C)  Heart Rate:  [] 92  Resp:  [18-20] 20  BP: (124-186)/() 124/82  Flow (L/min):  [2] 2     Physical Exam:  Constitutional: No acute distress, awake, alert  HENT: NCAT, mucous membranes moist  Respiratory: Clear to auscultation bilaterally, respiratory effort normal   Cardiovascular: RRR, no murmurs, rubs, or gallops  Gastrointestinal: Positive bowel sounds, soft, nontender, nondistended  Musculoskeletal: No bilateral ankle edema  Psychiatric: anxious affect, cooperative  Neurologic: Oriented x 3, no focal deficits  Skin: No rashes      Results Reviewed:  LAB RESULTS:      Lab 23  1217 23  1002   WBC  --  13.56*   HEMOGLOBIN  --  14.7   HEMATOCRIT  --  44.8   PLATELETS  --  262   NEUTROS ABS  --  10.69*   IMMATURE GRANS (ABS)  --  0.04   LYMPHS ABS  --  2.33   MONOS ABS  --  0.40   EOS ABS  --  0.05   MCV  --  90.0   PROTIME 14.8*  --    APTT 164.8*  --          Lab 23  0521 23  2336 23  1217 23  1002   SODIUM 132*  --  139 139   POTASSIUM 4.3 4.3 3.3* 2.7*   CHLORIDE 100  --  100 101   CO2 25.0  --  27.0 23.0   ANION GAP 7.0  --  12.0 15.0   BUN 7*  --  10 11   CREATININE 0.46*  --  0.54* 0.59   EGFR 107.7  --  103.6 101.4   GLUCOSE 135*  --  168* 239*   CALCIUM 9.3  --  9.1 9.0   MAGNESIUM  --   --   --  1.7   HEMOGLOBIN A1C 5.60  --   --   --          Lab 23  1002   TOTAL PROTEIN 6.8   ALBUMIN 3.8   GLOBULIN 3.0   ALT (SGPT) 9   AST (SGOT) 12   BILIRUBIN 0.6   ALK PHOS  103         Lab 11/20/23  0521 11/19/23  1217 11/19/23  1002   HSTROP T 1,988* 9 6   PROTIME  --  14.8*  --    INR  --  1.14*  --          Lab 11/19/23  1002   CHOLESTEROL 195   LDL CHOL 140*   HDL CHOL 29*   TRIGLYCERIDES 143             Brief Urine Lab Results  (Last result in the past 365 days)        Color   Clarity   Blood   Leuk Est   Nitrite   Protein   CREAT   Urine HCG        11/20/23 1337 Yellow   Clear   Small (1+)   Negative   Negative   Negative                   Microbiology Results Abnormal       Procedure Component Value - Date/Time    Respiratory Panel PCR w/COVID-19(SARS-CoV-2) [facility] In-House, NP Swab in UTM/VTM, 2 HR TAT - Swab, Nasopharynx [[id number]]  (Normal) Collected: 11/19/23 1605    Lab Status: Final result Specimen: Swab from Nasopharynx Updated: 11/19/23 1658     ADENOVIRUS, PCR Not Detected     Coronavirus 229E Not Detected     Coronavirus HKU1 Not Detected     Coronavirus NL63 Not Detected     Coronavirus OC43 Not Detected     COVID19 Not Detected     Human Metapneumovirus Not Detected     Human Rhinovirus/Enterovirus Not Detected     Influenza A PCR Not Detected     Influenza B PCR Not Detected     Parainfluenza Virus 1 Not Detected     Parainfluenza Virus 2 Not Detected     Parainfluenza Virus 3 Not Detected     Parainfluenza Virus 4 Not Detected     RSV, PCR Not Detected     Bordetella pertussis pcr Not Detected     Bordetella parapertussis PCR Not Detected     Chlamydophila pneumoniae PCR Not Detected     Mycoplasma pneumo by PCR Not Detected    Narrative:      In the setting of a positive respiratory panel with a viral infection PLUS a negative procalcitonin without other underlying concern for bacterial infection, consider observing off antibiotics or discontinuation of antibiotics and continue supportive care. If the respiratory panel is positive for atypical bacterial infection (Bordetella pertussis, Chlamydophila pneumoniae, or Mycoplasma pneumoniae), consider  antibiotic de-escalation to target atypical bacterial infection.            Adult Transthoracic Echo Complete W/ Cont if Necessary Per Protocol    Result Date: 11/20/2023    Left ventricular systolic function is moderately decreased. Calculated left ventricular EF = 37.9% Left ventricular ejection fraction appears to be 41 - 45%.   The following left ventricular wall segments are hypokinetic: basal anterolateral, mid anterolateral, apical lateral and mid inferolateral. The following left ventricular wall segments are akinetic: basal inferolateral.   Left ventricular diastolic function is consistent with (grade Ia w/high LAP) impaired relaxation.   Estimated right ventricular systolic pressure from tricuspid regurgitation is normal (<35 mmHg).   No significant structural or functional valvular disease.     XR Chest 1 View    Result Date: 11/19/2023  XR CHEST 1 VW Date of Exam: 11/19/2023 4:41 PM EST Indication: hypoxia Comparison: None available. Findings: Heart size and pulmonary vessels are within normal limits. Lungs are clear bilaterally. There are no pleural effusions. Bony structures are unremarkable.     Impression: Impression: 1. No acute cardiopulmonary disease. Electronically Signed: Alberto Camarena MD  11/19/2023 5:06 PM EST  Workstation ID: [id number]    Cardiac Catheterization/Vascular Study    Result Date: 11/19/2023    False STEMI, 100%  of the medium caliber codominant RCA with mature left-to-right collaterals   Codominant large circumflex tandem 80% stenoses of the proximal and mid status post PCI with a 4.0 x 23 mm Xience BERENICE.   Scattered mid to distal LAD 10-20%.  Normal left main.   LV pressures (S/D/E) : 138/-4/10 mmHg   The ejection fraction was greater than 55% by visual estimate.  With akinesis of the basal inferior segment.   Mild aortic stenosis may be present with a peak to peak gradient of 35 mmHg, we will check an echocardiogram Aspirin 81 mg for life, clopidogrel 75 mg daily for no less  than 6 months      Results for orders placed during the hospital encounter of 11/19/23    Adult Transthoracic Echo Complete W/ Cont if Necessary Per Protocol    Interpretation Summary    Left ventricular systolic function is moderately decreased. Calculated left ventricular EF = 37.9% Left ventricular ejection fraction appears to be 41 - 45%.    The following left ventricular wall segments are hypokinetic: basal anterolateral, mid anterolateral, apical lateral and mid inferolateral. The following left ventricular wall segments are akinetic: basal inferolateral.    Left ventricular diastolic function is consistent with (grade Ia w/high LAP) impaired relaxation.    Estimated right ventricular systolic pressure from tricuspid regurgitation is normal (<35 mmHg).    No significant structural or functional valvular disease.      Current medications:  Scheduled Meds:aspirin, 81 mg, Oral, Daily  clopidogrel, 75 mg, Oral, Daily  insulin lispro, 2-7 Units, Subcutaneous, 4x Daily AC & at Bedtime  losartan, 25 mg, Oral, Daily  metoprolol tartrate, 25 mg, Oral, Q12H  nicotine, 1 patch, Transdermal, Daily  pantoprazole, 40 mg, Oral, Q AM  pharmacy consult - MTM, , Does not apply, Daily  rosuvastatin, 20 mg, Oral, Nightly      Continuous Infusions:sodium chloride, 75 mL/hr, Last Rate: 75 mL/hr (11/19/23 1517)      PRN Meds:.  acetaminophen    ALPRAZolam    aluminum-magnesium hydroxide-simethicone    Calcium Replacement - Follow Nurse / BPA Driven Protocol    dextrose    dextrose    glucagon (human recombinant)    hydrALAZINE    HYDROcodone-acetaminophen    hydrOXYzine    ipratropium-albuterol    Magnesium Standard Dose Replacement - Follow Nurse / BPA Driven Protocol    nicotine polacrilex    nitroglycerin    ondansetron **OR** ondansetron    Phosphorus Replacement - Follow Nurse / BPA Driven Protocol    Potassium Replacement - Follow Nurse / BPA Driven Protocol    Assessment & Plan   Assessment & Plan     Active Hospital Problems     Diagnosis  POA    **Coronary artery disease of bypass graft of native heart with stable angina pectoris [I25.708]  Unknown    Anxiety [F41.9]  Unknown    Acute diarrhea [R19.7]  Unknown    Primary hypertension [I10]  Unknown    Tobacco abuse [Z72.0]  Unknown    Mixed hyperlipidemia [E78.2]  Unknown    CAD (coronary artery disease), native coronary artery [I25.10]  Yes      Resolved Hospital Problems   No resolved problems to display.        Brief Hospital Course to date:  Fatemeh Howard is a 63 y.o. female with past medical history significant for HTN, HLD, anxiety, and tobacco abuse who was transferred from [facility] on 11/19/2023 due to concern for STEMI. Hospital medicine was consulted to assist with medical management.      CAD  -s/p LHC with BERENICE to circumflex  -Management per primary     Acute hypoxic respiratory failure --resolved  -SpO2 86% on RA, does not use O2 at home  -Respiratory PCR neg  -CXR shows no acute disease  -wean O2 as tolerated, encouraged IS/flutter valve  -IS, Pulm toilet  -DuoNebs PRN      Leukocytosis  Diarrhea  Hx IBS   -WBC 13.56 on presentation  -GI PCR, Cdiff pending  -UA unremarkable  -s/p fluids. Can discontinue if tolerating PO diet  -Afebrile  --suspect her diarrhea is secondary to underlying IBS/anxiety issues. Less likely infectious. PRN meds if infection r/o. Ultimately needs better control of underlying anxiety, recommend outpatient psych referral      Hypokalemia  -Replace per protocol      Hyperglycemia   - on presentation  -A1c is 5.6     Anxiety  -Xanax, Atarax PRN   -May benefit from  referral at d/c     Expected Discharge Location and Transportation: home  Expected Discharge   Expected Discharge Date: 11/20/2023; Expected Discharge Time:      DVT prophylaxis:  No DVT prophylaxis order currently exists.     AM-PAC 6 Clicks Score (PT): 23 (11/19/23 0636)    CODE STATUS:   Code Status and Medical Interventions:   Ordered at: 11/19/23 2028     Code Status  (Patient has no pulse and is not breathing):    CPR (Attempt to Resuscitate)     Medical Interventions (Patient has pulse or is breathing):    Full Support       Leila Lucero MD  11/20/23

## 2023-11-20 NOTE — PAYOR COMM NOTE
"Fatemeh Howard (63 y.o. Female)     Vickie Patel, RN  Utilization Review  [id number]-[phone]  [phone]        Date of Birth   1960    Social Security Number       Address   [address]  [address] [facility]    Home Phone   [phone]    MRN   [id number]       Buddhism   None    Marital Status   Single                            Admission Date   11/19/23    Admission Type   Urgent    Admitting Provider   Chaitanya Linn MD    Attending Provider   Chaitanya Linn MD    Department, Room/Bed   [facility] 6A, N605/1       Discharge Date       Discharge Disposition       Discharge Destination                                 Attending Provider: Chaitanya Linn MD    Allergies: No Known Allergies    Isolation: Spore   Infection: C.difficile (rule out) (11/19/23)   Code Status: CPR    Ht: 167 cm (65.75\")   Wt: 86 kg (189 lb 9.5 oz)    Admission Cmt: None   Principal Problem: Coronary artery disease of bypass graft of native heart with stable angina pectoris [I25.708]                   Active Insurance as of 11/19/2023       Primary Coverage       Payor Plan Insurance Group Employer/Plan Group    [employer] [place] [employer] [place]ICA[place]        Payor Plan Address Payor Plan Phone Number Payor Plan Fax Number Effective Dates    [address] [address] [phone]  1/5/2021 - None Entered    [facility] [id number]         Subscriber Name Subscriber Birth Date Member ID       FATEMEH HOWARD 1960 [id number]                     Emergency Contacts        (Rel.) Home Phone Work Phone Mobile Phone    DYLON HOWARD (Son) [phone] -- --                 History & Physical        Chaitanya Linn MD at 11/19/23 1057          [facility]  Cardiology H&P Note  [facility] CATH LAB          Patient Identification:  Fatemeh Howard      [id number]  63 y.o.        female  1960       Date of Admission:  11/19/23    Chief complaint: " Palpitations    PCP: Provider, No Known  Primary cardiologist: None    History of Present Illness:     63-year-old female with history of tobacco abuse, hypertension, hyperlipidemia, anxiety, presenting from [facility] where she presented with tachycardia and palpitations with shortness of breath for the last 3 days, initial EKG showed inferior ST elevation with no reciprocal changes, she presented to [facility] Lab underwent emergent cardiac catheterization found to have  of the codominant RCA with collaterals, proximal codominant circumflex tandem 80% stenoses status post PCI of the proximal circumflex with 1 drug-eluting stent, relatively normal LAD, LVEDP 10 mmHg with EF 50 to 55% and basal inferior akinesis.    Past History:  No past medical history on file.  No past surgical history on file.  Not on File  Social History     Socioeconomic History    Marital status: Unknown     No family history on file.  Medications:  No medications prior to admission.     Current medications:    Current IV drips:         Review of Systems:    Constitutional no fever,  no weight loss   Skin no rash   Otolaryngeal no difficulty swallowing   Cardiovascular See HPI   Pulmonary no cough, no sputum production   Gastrointestinal no constipation, positive for diarrhea   Genitourinary no dysuria, no hematuria   Hematologic no easy bruisability, no abnormal bleeding   Musculoskeletal no muscle pain   Neurologic no dizziness, no falls     Physical exam:    /94   Pulse 84   Resp 18   Wt 86.2 kg (190 lb)   SpO2 94%  There is no height or weight on file to calculate BMI.   Oxygen saturation   SpO2  Min: 91 %  Max: 94 %    General Appearance:   well developed  well nourished  HENT:   oropharynx moist  lips not cyanotic  Neck:  thyroid not enlarged  supple  Respiratory:  no respiratory distress  normal breath sounds  no rales  Cardiovascular:  no jugular venous distention  regular rhythm  apical impulse normal  S1 normal, S2  "normal  no S3, no S4   no murmur  no rub, no thrill  carotid pulses normal; no bruit  pedal pulses normal  lower extremity edema: none    Gastrointestinal:   bowel sounds normal  non-tender  no hepatomegaly, no splenomegaly  Musculoskeletal:  no clubbing of fingers.   normocephalic, head atraumatic  Skin:   warm, dry  Psychiatric:  judgement and insight appropriate  normal mood and affect    Cardiographics:     ECG  (personally reviewed) sinus tachycardia with inferior J-point elevation   Telemetry:  (personally reviewed) sinus rhythm   ECHO:        CATH: Findings as above   CARDIOLITE: None    Lab Review:       Results from last 7 days   Lab Units 11/19/23  1002   WBC 10*3/mm3 13.56*   HEMOGLOBIN g/dL 14.7   HEMATOCRIT % 44.8            Results from last 7 days   Lab Units 11/19/23  1002   SODIUM mmol/L 139   BUN mg/dL 11   CREATININE mg/dL 0.59   GLUCOSE mg/dL 239*      CrCl cannot be calculated (Unknown ideal weight.).     Lab Results   Component Value Date    CHOL 195 11/19/2023    TRIG 143 11/19/2023    HDL 29 (L) 11/19/2023     (H) 11/19/2023    AST 12 11/19/2023    ALT 9 11/19/2023           No results found for: \"TSH\"   No results found for: \"HGBA1C\"        No results found for: \"DIGOXIN\"   No results found for: \"DDIMERQUANT\"     Imaging:  All pertinent imaging studies were personally reviewed.    Assessment:      Coronary artery disease of bypass graft of native heart with stable angina pectoris    Anxiety    Acute diarrhea    Primary hypertension    Tobacco abuse    Mixed hyperlipidemia          Recommendations:  1.  CAD:  Not a STEMI  Status post PCI of the proximal to mid circumflex with a 4.0 drug-eluting stent  Aspirin 81 mg for life  Clopidogrel 75 mg daily for 6 months  High intensity statin therapy will be initiated  Beta-blocker will be initiated  Tobacco cessation recommended as well as cardiac rehab    2.  Tobacco abuse:  Discussed the importance complete tobacco cessation, assistance will " be offered.    3.  Hypertension:  Goal blood pressure less than 130/80  We will adjust home medications and add beta-blocker    4.  Hyperlipidemia:  Goal LDL less than 50  High intensity statin therapy initiated    5.  Diarrhea:  Consult hospitalist for appropriate work-up  She also has mild leukocytosis.        Discussed with patient's nurse, and  patient's family        Chaitanya Linn MD  11/19/2023  11:01 EST     Electronically signed by Chaitanya Linn MD at 11/19/23 1103       Emergency Department Notes    No notes of this type exist for this encounter.       Vital Signs (last day)       Date/Time Temp Temp src Pulse Resp BP Patient Position SpO2    11/20/23 0940 97.8 (36.6) Oral 92 20 124/82 Lying 94    11/20/23 0325 97.8 (36.6) Oral 106 20 160/102 Lying 93    11/20/23 0247 -- -- -- -- 152/88 -- --    11/20/23 0151 -- -- -- -- 176/123 -- --    11/20/23 0035 98.5 (36.9) Oral 96 18 184/124 Sitting 92    11/19/23 2232 -- -- -- -- 179/107 -- --    11/19/23 2100 -- -- -- -- 186/118 -- --    11/19/23 2032 98.7 (37.1) Oral 97 20 181/112 Lying 94    11/19/23 1635 98.1 (36.7) Oral -- -- 158/100 Lying --    11/19/23 1554 -- -- -- -- 161/101 -- --    11/19/23 1100 -- -- -- -- 161/106 -- --    11/19/23 10:39:07 -- -- 84 18 153/94 -- 94    11/19/23 09:41:23 -- -- -- -- -- -- 91    11/19/23 09:40:50 -- -- 81 18 164/97 -- 92          Oxygen Therapy (last day)       Date/Time SpO2 Device (Oxygen Therapy) Flow (L/min) Oxygen Concentration (%) ETCO2 (mmHg)    11/20/23 0940 94 room air -- -- --    11/20/23 0325 93 nasal cannula 2 -- --    11/20/23 0035 92 -- -- -- --    11/19/23 2258 -- nasal cannula 2 -- --    11/19/23 2101 -- room air -- -- --    11/19/23 2032 94 -- -- -- --    11/19/23 1800 -- room air -- -- --    11/19/23 1600 -- room air -- -- --    11/19/23 1400 -- room air -- -- --    11/19/23 1250 -- nasal cannula 2 -- --    11/19/23 10:39:07 94 -- -- -- --    11/19/23 09:41:23 91 nasal cannula with ETCO2 4 -- --     11/19/23 09:40:50 92 -- -- -- --          Lines, Drains & Airways       Active LDAs       Name Placement date Placement time Site Days    Peripheral IV 11/19/23 1200 Anterior;Distal;Left Forearm 11/19/23  1200  Forearm  1                  Current Facility-Administered Medications   Medication Dose Route Frequency Provider Last Rate Last Admin    acetaminophen (TYLENOL) tablet 650 mg  650 mg Oral Q4H PRN Angelo Pettit, APRN        ALPRAZolam (XANAX) tablet 0.5 mg  0.5 mg Oral TID PRN HodanAneglo hill, APRN   0.5 mg at 11/19/23 2234    aluminum-magnesium hydroxide-simethicone (MAALOX MAX) 400-400-40 MG/5ML suspension 15 mL  15 mL Oral Q6H PRN Angelo Pettit, APRN        aspirin EC tablet 81 mg  81 mg Oral Daily HodanAngelo hill, APRN   81 mg at 11/20/23 0940    Calcium Replacement - Follow Nurse / BPA Driven Protocol   Does not apply PRN HodanAngelo hill A, APRN        clopidogrel (PLAVIX) tablet 75 mg  75 mg Oral Daily HodanAngelo A, APRN   75 mg at 11/20/23 0940    dextrose (D50W) (25 g/50 mL) IV injection 25 g  25 g Intravenous Q15 Min PRN Angelo Pettit APRN        dextrose (GLUTOSE) oral gel 15 g  15 g Oral Q15 Min PRN Angelo Pettit A, APRN        glucagon (GLUCAGEN) injection 1 mg  1 mg Intramuscular Q15 Min PRN Angelo Pettit APRN        hydrALAZINE (APRESOLINE) injection 10 mg  10 mg Intravenous Q6H PRN Leticia Boyle APRN   10 mg at 11/20/23 0222    HYDROcodone-acetaminophen (NORCO) 7.5-325 MG per tablet 1 tablet  1 tablet Oral Q4H PRN Angelo Pettit, APRN        hydrOXYzine (ATARAX) tablet 25 mg  25 mg Oral TID PRN Angelo Pettit A, APRN   25 mg at 11/20/23 0955    Insulin Lispro (humaLOG) injection 2-7 Units  2-7 Units Subcutaneous 4x Daily AC & at Bedtime Hodan, Angelo A, APRN        ipratropium-albuterol (DUO-NEB) nebulizer solution 3 mL  3 mL Nebulization Q4H PRN Angelo Pettit APRN        losartan (COZAAR) tablet 25 mg  25 mg Oral Daily Angelo Pettit APRN   25 mg at 11/20/23 0940     Magnesium Standard Dose Replacement - Follow Nurse / BPA Driven Protocol   Does not apply PRN Hodan, Angelo A, APRN        metoprolol tartrate (LOPRESSOR) tablet 25 mg  25 mg Oral Q12H HodanAngelo A, APRN   25 mg at 11/20/23 0940    nicotine (NICODERM CQ) 21 MG/24HR patch 1 patch  1 patch Transdermal Daily HodanAngelo hill APRN   1 patch at 11/20/23 0943    nicotine polacrilex (NICORETTE) gum 4 mg  4 mg Mouth/Throat Q1H PRN Hodan, Angelo A, APRN        nitroglycerin (NITROSTAT) SL tablet 0.4 mg  0.4 mg Sublingual Q5 Min PRN HodanAngelo hill A, APRN        ondansetron (ZOFRAN) tablet 4 mg  4 mg Oral Q6H PRN HodanAngelo, APRN        Or    ondansetron (ZOFRAN) injection 4 mg  4 mg Intravenous Q6H PRN HodanAngelo alvarez A, APRN        pantoprazole (PROTONIX) EC tablet 40 mg  40 mg Oral Q AM Betsey Nicole PA-C        Pharmacy Consult - MTM   Does not apply Daily Hodan, Angelo A, APRN        Phosphorus Replacement - Follow Nurse / BPA Driven Protocol   Does not apply PRN Hodan, Nagelo A, APRN        Potassium Replacement - Follow Nurse / BPA Driven Protocol   Does not apply PRN Hodan, Angelo A, APRN        rosuvastatin (CRESTOR) tablet 20 mg  20 mg Oral Nightly HodanAngelo hill A, APRN   20 mg at 11/19/23 2102    sodium chloride 0.9 % infusion  75 mL/hr Intravenous Continuous HodanRanulfo hillua A, APRN 75 mL/hr at 11/19/23 1517 75 mL/hr at 11/19/23 1517     Lab Results (last 24 hours)       Procedure Component Value Units Date/Time    POC Glucose Once [[id number]]  (Normal) Collected: 11/20/23 1143    Specimen: Blood Updated: 11/20/23 1145     Glucose 113 mg/dL     POC Glucose Once [[id number]]  (Abnormal) Collected: 11/20/23 0841    Specimen: Blood Updated: 11/20/23 0843     Glucose 135 mg/dL     Basic Metabolic Panel [[id number]]  (Abnormal) Collected: 11/20/23 0521    Specimen: Blood Updated: 11/20/23 0659     Glucose 135 mg/dL      BUN 7 mg/dL      Creatinine 0.46 mg/dL      Sodium 132 mmol/L      Potassium 4.3 mmol/L       Chloride 100 mmol/L      CO2 25.0 mmol/L      Calcium 9.3 mg/dL      BUN/Creatinine Ratio 15.2     Anion Gap 7.0 mmol/L      eGFR 107.7 mL/min/1.73     Narrative:      GFR Normal >60  Chronic Kidney Disease <60  Kidney Failure <15      High Sensitivity Troponin T [[id number]]  (Abnormal) Collected: 11/20/23 0521    Specimen: Blood Updated: 11/20/23 0642     HS Troponin T 1,988 ng/L     Narrative:      High Sensitive Troponin T Reference Range:  <14.0 ng/L- Negative Female for AMI  <22.0 ng/L- Negative Male for AMI  >=14 - Abnormal Female indicating possible myocardial injury.  >=22 - Abnormal Male indicating possible myocardial injury.   Clinicians would have to utilize clinical acumen, EKG, Troponin, and serial changes to determine if it is an Acute Myocardial Infarction or myocardial injury due to an underlying chronic condition.         Hemoglobin A1c [[id number]]  (Normal) Collected: 11/20/23 0521    Specimen: Blood Updated: 11/20/23 0637     Hemoglobin A1C 5.60 %     Narrative:      Hemoglobin A1C Ranges:    Increased Risk for Diabetes  5.7% to 6.4%  Diabetes                     >= 6.5%  Diabetic Goal                < 7.0%    P2Y12 Platelet Inhibition [[id number]] Collected: 11/20/23 0521    Specimen: Blood Updated: 11/20/23 0617     P2Y12 Reactivity Unit 144 PRU     Narrative:      P2Y12 Interpretation:  Pre-Drug normal reference range is 194-418 PRU.  Test results are reported in P2Y12 reaction units (PRU). This measures the extent of platelet aggregation in the presence of P2Y12 inhibitor drugs, such as clopidogrel (Plavix), prasugrel (Effient), ticagrelor (Brilinta), ticlopidine (Ticlid).  P2Y12 values <194 PRU (low end of reference range) are specific evidence of a P2Y12 inhibitor effect.  Patients who have been treated with Glycoprotein IIb/IIIa inhibitors should not be tested until platelet function has recovered. This time period is approximately 14 days after discontinuation of abciximab (ReoPro) and up  to 48 hours after discontinuation of eptifibatide (Integrilin) and tirofiban (Aggrastat).   The P2Y12 test results should be interpreted in conjunction with other clinical and lab data available to the clinician.    Potassium [[id number]]  (Normal) Collected: 11/19/23 2336    Specimen: Blood Updated: 11/20/23 0042     Potassium 4.3 mmol/L      Comment: Slight hemolysis detected by analyzer. Result may be falsely elevated.       POC Glucose Once [[id number]]  (Normal) Collected: 11/19/23 2039    Specimen: Blood Updated: 11/19/23 2040     Glucose 109 mg/dL     Respiratory Panel PCR w/COVID-19(SARS-CoV-2) ENRIQUETA/MARIA DEL ROSARIO/MATTY/PAD/COR/[facility]ITAL In-House, NP Swab in UTM/VTM, 2 HR TAT - Swab, Nasopharynx [[id number]]  (Normal) Collected: 11/19/23 1605    Specimen: Swab from Nasopharynx Updated: 11/19/23 1658     ADENOVIRUS, PCR Not Detected     Coronavirus 229E Not Detected     Coronavirus HKU1 Not Detected     Coronavirus NL63 Not Detected     Coronavirus OC43 Not Detected     COVID19 Not Detected     Human Metapneumovirus Not Detected     Human Rhinovirus/Enterovirus Not Detected     Influenza A PCR Not Detected     Influenza B PCR Not Detected     Parainfluenza Virus 1 Not Detected     Parainfluenza Virus 2 Not Detected     Parainfluenza Virus 3 Not Detected     Parainfluenza Virus 4 Not Detected     RSV, PCR Not Detected     Bordetella pertussis pcr Not Detected     Bordetella parapertussis PCR Not Detected     Chlamydophila pneumoniae PCR Not Detected     Mycoplasma pneumo by PCR Not Detected    Narrative:      In the setting of a positive respiratory panel with a viral infection PLUS a negative procalcitonin without other underlying concern for bacterial infection, consider observing off antibiotics or discontinuation of antibiotics and continue supportive care. If the respiratory panel is positive for atypical bacterial infection (Bordetella pertussis, Chlamydophila pneumoniae, or Mycoplasma pneumoniae), consider antibiotic  de-escalation to target atypical bacterial infection.    POC Glucose Once [[id number]]  (Abnormal) Collected: 11/19/23 1636    Specimen: Blood Updated: 11/19/23 1637     Glucose 131 mg/dL     Protime-INR [[id number]]  (Abnormal) Collected: 11/19/23 1217    Specimen: Blood Updated: 11/19/23 1319     Protime 14.8 Seconds      INR 1.14    aPTT [[id number]]  (Abnormal) Collected: 11/19/23 1217    Specimen: Blood Updated: 11/19/23 1319     .8 seconds     Narrative:      PTT = The equivalent PTT values for the therapeutic range of heparin levels at 0.3 to 0.5 U/ml are 60 to 70 seconds.    Basic Metabolic Panel [[id number]]  (Abnormal) Collected: 11/19/23 1217    Specimen: Blood Updated: 11/19/23 1312     Glucose 168 mg/dL      BUN 10 mg/dL      Creatinine 0.54 mg/dL      Sodium 139 mmol/L      Potassium 3.3 mmol/L      Chloride 100 mmol/L      CO2 27.0 mmol/L      Calcium 9.1 mg/dL      BUN/Creatinine Ratio 18.5     Anion Gap 12.0 mmol/L      eGFR 103.6 mL/min/1.73     Narrative:      GFR Normal >60  Chronic Kidney Disease <60  Kidney Failure <15      High Sensitivity Troponin T 2Hr [[id number]]  (Normal) Collected: 11/19/23 1217    Specimen: Blood Updated: 11/19/23 1304     HS Troponin T 9 ng/L      Troponin T Delta 3 ng/L     Narrative:      High Sensitive Troponin T Reference Range:  <14.0 ng/L- Negative Female for AMI  <22.0 ng/L- Negative Male for AMI  >=14 - Abnormal Female indicating possible myocardial injury.  >=22 - Abnormal Male indicating possible myocardial injury.   Clinicians would have to utilize clinical acumen, EKG, Troponin, and serial changes to determine if it is an Acute Myocardial Infarction or myocardial injury due to an underlying chronic condition.               Imaging Results (Last 24 Hours)       Procedure Component Value Units Date/Time    XR Chest 1 View [[id number]] Collected: 11/19/23 1706     Updated: 11/19/23 1709    Narrative:      XR CHEST 1 VW    Date of Exam: 11/19/2023 4:41 PM  EST    Indication: hypoxia    Comparison: None available.    Findings:  Heart size and pulmonary vessels are within normal limits. Lungs are clear bilaterally. There are no pleural effusions. Bony structures are unremarkable.      Impression:      Impression:    1. No acute cardiopulmonary disease.      Electronically Signed: Alberto Camarena MD    11/19/2023 5:06 PM EST    Workstation ID: [id number]          ECG/EMG Results (last 24 hours)       Procedure Component Value Units Date/Time    ECG 12 Lead Other; Post-Cath [[id number]] Collected: 11/19/23 1518     Updated: 11/19/23 1519     QT Interval 408 ms      QTC Interval 452 ms     Narrative:      Test Reason : Other~  Blood Pressure :   */*   mmHG  Vent. Rate :  74 BPM     Atrial Rate :  74 BPM     P-R Int : 130 ms          QRS Dur : 106 ms      QT Int : 408 ms       P-R-T Axes :  24 -47  71 degrees     QTc Int : 452 ms    Sinus rhythm with premature atrial complexes  Left anterior fascicular block  Cannot rule out Inferior infarct , age undetermined  Anterior infarct , age undetermined  Abnormal ECG  No previous ECGs available    Referred By: [email or web address]           Confirmed By:     Adult Transthoracic Echo Complete W/ Cont if Necessary Per Protocol [[id number]] Resulted: 11/20/23 1129     Updated: 11/20/23 1137     EF(MOD-bp) 37.9 %      LVIDd 4.5 cm      LVIDs 3.0 cm      IVSd 1.50 cm      LVPWd 1.30 cm      FS 33.3 %      IVS/LVPW 1.15 cm      ESV(cubed) 27.0 ml      LV Sys Vol (BSA corrected) 44.4 cm2      EDV(cubed) 91.1 ml      LV Zavala Vol (BSA corrected) 68.7 cm2      LV mass(C)d 248.4 grams      LVOT area 3.1 cm2      LVOT diam 2.00 cm      EDV(MOD-sp2) 129.0 ml      EDV(MOD-sp4) 134.0 ml      ESV(MOD-sp2) 79.0 ml      ESV(MOD-sp4) 86.5 ml      SV(MOD-sp2) 50.0 ml      SV(MOD-sp4) 47.5 ml      SI(MOD-sp2) 25.6 ml/m2      SI(MOD-sp4) 24.4 ml/m2      EF(MOD-sp2) 54.1 %      EF(MOD-sp4) 36.8 %      MV E max mara 48.7 cm/sec      MV A max mara 92.2 cm/sec      MV dec time  0.24 sec      MV E/A 0.53     LA ESV Index (BP) 15.5 ml/m2      Med Peak E' Allan 3.4 cm/sec      Lat Peak E' Allan 2.9 cm/sec      Avg E/e' ratio 15.46     SV(LVOT) 47.9 ml      RV Base 2.30 cm      RV Mid 2.10 cm      RV Length 7.6 cm      TAPSE (>1.6) 1.82 cm      RV S' 13.2 cm/sec      LA dimension (2D)  4.0 cm      LV V1 max 79.4 cm/sec      LV V1 max PG 2.5 mmHg      LV V1 mean PG 1.00 mmHg      LV V1 VTI 15.3 cm      Ao pk allan 131.0 cm/sec      Ao max PG 6.9 mmHg      Ao mean PG 4.0 mmHg      Ao V2 VTI 23.3 cm      GEOVANNA(I,D) 2.06 cm2      MV max PG 5.2 mmHg      MV mean PG 2.00 mmHg      MV V2 VTI 22.8 cm      MVA(VTI) 2.10 cm2      TR max allan 125.0 cm/sec      TR max PG 6.3 mmHg      PA V2 max 102.6 cm/sec      PA acc time 0.17 sec      Ao root diam 2.8 cm      EF - Contrast (2Ch) 38.8 cm2      EF - Contrast (4Ch) 35.4 cm2      RVSP(TR) 9 mmHg      RAP systole 3 mmHg     Narrative:        Left ventricular systolic function is moderately decreased. Calculated   left ventricular EF = 37.9% Left ventricular ejection fraction appears to   be 41 - 45%.    The following left ventricular wall segments are hypokinetic: basal   anterolateral, mid anterolateral, apical lateral and mid inferolateral.   The following left ventricular wall segments are akinetic: basal   inferolateral.    Left ventricular diastolic function is consistent with (grade Ia w/high   LAP) impaired relaxation.    Estimated right ventricular systolic pressure from tricuspid   regurgitation is normal (<35 mmHg).    No significant structural or functional valvular disease.            Operative/Procedure Notes (last 24 hours)  Notes from 11/19/23 1301 through 11/20/23 1301   No notes of this type exist for this encounter.          Physician Progress Notes (last 24 hours)        Betsey Nicole PA-C at 11/20/23 1131            [facility]  PROGRESS NOTE    Date of Admission: 11/19/2023  Date of Service:  "11/20/23    Primary Care Physician: Provider, No Known    Chief Complaint:  follow up CAD   Problem List:   Coronary artery disease of bypass graft of native heart with stable angina pectoris    Anxiety    Acute diarrhea    Primary hypertension    Tobacco abuse    Mixed hyperlipidemia    CAD (coronary artery disease), native coronary artery      Subjective      No acute events overnight. No angina or CHF symptoms. Continues to have belching and diarrhea. BP well controlled. Radial access site CDI.        Objective   Vitals: /82 (BP Location: Left arm, Patient Position: Lying)   Pulse 92   Temp 97.8 °F (36.6 °C) (Oral)   Resp 20   Ht 167 cm (65.75\")   Wt 86 kg (189 lb 9.5 oz)   SpO2 94%   BMI 30.84 kg/m²     Physical Exam:  GENERAL:  in no acute distress.         HEENT: no jugular venous distention  HEART: Regular rhythm, normal rate, and no murmurs, gallops, or rubs.   LUNGS: Clear to auscultation bilaterally. No wheezing, rales or rhonchi.  EXTREMITIES: No clubbing, cyanosis, or edema noted. Radial access site CDI     Results:  Results from last 7 days   Lab Units 11/19/23  1002   WBC 10*3/mm3 13.56*   HEMOGLOBIN g/dL 14.7   HEMATOCRIT % 44.8   PLATELETS 10*3/mm3 262     Results from last 7 days   Lab Units 11/20/23  0521 11/19/23  2336 11/19/23  1217 11/19/23  1002   SODIUM mmol/L 132*  --  139 139   POTASSIUM mmol/L 4.3 4.3 3.3* 2.7*   CHLORIDE mmol/L 100  --  100 101   CO2 mmol/L 25.0  --  27.0 23.0   BUN mg/dL 7*  --  10 11   CREATININE mg/dL 0.46*  --  0.54* 0.59   GLUCOSE mg/dL 135*  --  168* 239*      Lab Results   Component Value Date    CHOL 195 11/19/2023    TRIG 143 11/19/2023    HDL 29 (L) 11/19/2023     (H) 11/19/2023    AST 12 11/19/2023    ALT 9 11/19/2023     Results from last 7 days   Lab Units 11/20/23  0521   HEMOGLOBIN A1C % 5.60     Results from last 7 days   Lab Units 11/19/23  1002   CHOLESTEROL mg/dL 195   TRIGLYCERIDES mg/dL 143   HDL CHOL mg/dL 29*   LDL CHOL mg/dL 140* "             Results from last 7 days   Lab Units 11/19/23  1217   PROTIME Seconds 14.8*   INR  1.14*   APTT seconds 164.8*     Results from last 7 days   Lab Units 11/20/23  0521 11/19/23  1217 11/19/23  1002   HSTROP T ng/L 1,988* 9 6             Intake/Output Summary (Last 24 hours) at 11/20/2023 1131  Last data filed at 11/20/2023 0325  Gross per 24 hour   Intake --   Output 1250 ml   Net -1250 ml       I personally reviewed the patient's EKG/Telemetry data      Current Medications:  aspirin, 81 mg, Oral, Daily  clopidogrel, 75 mg, Oral, Daily  insulin lispro, 2-7 Units, Subcutaneous, 4x Daily AC & at Bedtime  losartan, 25 mg, Oral, Daily  metoprolol tartrate, 25 mg, Oral, Q12H  nicotine, 1 patch, Transdermal, Daily  pantoprazole, 40 mg, Oral, Q AM  pharmacy consult - MTM, , Does not apply, Daily  rosuvastatin, 20 mg, Oral, Nightly      sodium chloride, 75 mL/hr, Last Rate: 75 mL/hr (11/19/23 1517)        Assessment and Plan:   1.  CAD:  Not a STEMI  Status post PCI of the proximal to mid circumflex with a 4.0 drug-eluting stent  Aspirin 81 mg for life  Clopidogrel 75 mg daily for 6 months  High intensity statin therapy initiated  Metoprolol 25mg BID started yesterday.  Tobacco cessation recommended as well as cardiac rehab  Echocardiogram is pending.      2.  Tobacco abuse:  Discussed the importance complete tobacco cessation, assistance will be offered.     3.  Hypertension:  Goal blood pressure less than 130/80  Continue losartan 25mg daily and lopressor 25mg BID.      4.  Hyperlipidemia:  Goal LDL less than 50  Crestor 20mg daily initiated this admission     5.  Diarrhea and belching:  hospitalist following for appropriate work-up  She also has mild leukocytosis.  Add protonix 40mg daily.   Replace potassium per protocol.         DC home when all agree. Follow up with Dr. Ray in 6 weeks in our [place] office.          Electronically signed by Betsey Nicole PA-C at 11/20/23 1132          [facility]  [facility] CATH LAB  [address] [address]  [phone]              Cardiac Catheterization/Vascular Study    Accession Number: [id number]   Date of Study: 23   Ordering Provider: Chaitanya Linn MD   Clinical Indications: STEMI        Performing Physician  Performing Staff   Primary: Chaitanya Linn MD    Scrub Person: Lizbeth Boothe   Documenter: Angelo Rodgers RN   Invasive Nurse: Yolette Miller RN         Procedures    Left Heart Cath   Stent BERENICE coronary        Patient Information    Patient Name  Fatemeh Howard MRN  [id number] Legal Sex  Female  (Age)  1960 (63 y.o.)     Race Ethnicity Encounter Category   White or  Not  or  Urgent        [facility] PACS Images     Show images for Cardiac Catheterization/Vascular Study       Printable Vessel Diagram    Printable Vessel Diagram       Physicians    Panel Physicians Referring Physician Case Authorizing Physician   Chaitanya Linn MD (Primary)             Pre Procedure Diagnosis    Possible STEMI      Post Procedure Diagnosis    False STEMI  Status post PCI of the circumflex        Conclusion         False STEMI, 100%  of the medium caliber codominant RCA with mature left-to-right collaterals    Codominant large circumflex tandem 80% stenoses of the proximal and mid status post PCI with a 4.0 x 23 mm Xience BERENICE.    Scattered mid to distal LAD 10-20%.  Normal left main.    LV pressures (S/D/E) : 138/-4/10 mmHg    The ejection fraction was greater than 55% by visual estimate.  With akinesis of the basal inferior segment.    Mild aortic stenosis may be present with a peak to peak gradient of 35 mmHg, we will check an echocardiogram     Aspirin 81 mg for life, clopidogrel 75 mg daily for no less than 6 months           Recommendations         Dual antiplatelet therapy for six months.       Procedure Narrative    Informed consent was obtained with the goals, rationale, alternatives, risks  and benefits of the procedure explained to the patient.  A 6Fr Terumo slender sheath was placed in the right radial artery.  However we could not advance the catheter beyond the distal brachial artery likely due to a 360 degree bend in the antecubital fossa.  Therefore we obtained access in the right common femoral artery using micropuncture technique and ultrasound guidance with delivery of a 6 Kazakh sheath.  There was difficulty traversing the aortoiliac bifurcation and therefore exchange length wire was used for all exchanges.  Selective angiography of the right coronary artery was performed with a 5Fr JR4 diagnostic catheter.  Selective angiography of the left coronary arteries was performed with a 5Fr JL4 diagnostic catheter.  Left heart catheterization was performed with a 5Fr JR4 catheter placed in the left ventricle.     After diagnostic angiography the decision was made to carry out PCI of the RCA and circumflex vessel.  Therapeutic heparin was administered and ACT were monitored and therapeutic throughout the case. Initially JR4 with sideholes guide was used to cannulate the RCA, a blue wire was used to cross the mid subtotal lesion, but was unsuccessful therefore PT 2 wire with a fine cross microcatheter was used but could not cross the lesion it does appear to be a chronic lesion with left-to-right collaterals and a functional   Next A 6Fr CLS 3.5 guide catheter was used to engage the left main vessel.  Next a 0.014 [place] Blue interventional wire was used to cross the proximal and mid circumflex lesion and parked in the distal vessel.  The lesion was predilated with a 3.0 x 12 NC balloon at nominal pressure.  Due to residual stenosis, a 4.0 x 23 mm Xience drug-eluting stent was deployed in the lesion at nominal pressure, GuideLiner used for delivery.  The stent was then postdilated with the 4.0 x 12 mm NC balloon at high pressure.  At the conclusion of the intervention there is no evidence of  complication such as dissection or perforation, there was KLARISSA-3 flow in the vessel.  The patient tolerated the procedure well and will be taken to the recovery area for further monitoring.    PRE-Intervention  Vessel: Mid circumflex  Stenosis: 80%  ACC/AHA: Type C lesion  KLARISSA Flow 3    Post-Intervention:  Stenosis: 0%  KLARISSA 3 Flow  No complication         The procedure was completed and the sheath was removed. A radial patent hemostasis band was placed for hemostasis. A 6 Thai Angio-Seal was placed to close the right common femoral arteriotomy site.    Risks, benefits and alternatives were discussed with the patient and/or family. Plan is for moderate sedation. An immediate assessment was done prior to the administration of moderate sedation. Under my direct supervision, intravenous moderate sedation sedation was administered during the course of this procedure with continuous monitoring of hemodynamic parameters and level of consciousness by an independent trained observer. Less than 20 mL of estimated blood loss during the case. No specimen was collected during the case.       Time Under Physician Observation    Name Panel Role Time Period   Chaitanya Linn MD Panel 1 Primary 11/19/2023 0930 - 11/19/2023 1037      Total Sedation Time    Moderate sedation event time was not documented.         Coronary Findings      Diagnostic  Dominance: Co-dominant  Number of lesions: 3      Left Main   The vessel was visualized by angiography, is large in size and is angiographically normal.      Left Anterior Descending   Mid LAD lesion is 10% stenosed.      Left Circumflex   Prox Cx to Mid Cx lesion is 80% stenosed.      Right Coronary Artery   Prox RCA to Mid RCA lesion is 100% stenosed.      Right Posterior Descending Artery   Collaterals   RPDA filled by collaterals from Dist LAD.           Intervention  Number of interventions: 1        Mid LAD lesion   Post-Intervention Lesion Assessment   There is a 0% residual  stenosis post intervention.      Prox Cx to Mid Cx lesion   Stent   Stent was successfully placed.   Post-Intervention Lesion Assessment   There is a 0% residual stenosis post intervention.          Left Heart Findings      Left Ventricle    The left ventricular ejection fraction is greater than 55% by visual estimation.         Wall Motion        The following segments are akinetic: basilar inferior.                         Vessel Access    A 6 fr sheath was  inserted with ultrasound guidance into the right radial artery. A 6 fr sheath was  inserted using micropuncture technique into the right femoral artery.           Sheath Disposition    Hemostasis started on the right femoral artery. Angio-Seal was used in achieving hemostasis. Closure device deployed in the vessel. Hemostasis achieved successfully. Hemostasis started on the right radial artery. R-Band was used in achieving hemostasis. Radial compression device applied to vessel. Hemostasis achieved successfully. Closure device additional comment: TR band 12CC         Stent Inflated     Event Details User   10:20 AM Stent Inflated Stnt Cornry Rx Xience/Skypoint Rapdxng 4x23mm - First balloon inflation max pressure = 15 jed. First balloon inflation duration = 10 seconds. JL       Balloon Inflated     Event Details User   10:14 AM Balloon Inflated Baln Dil Nctrekneo Rapdxng 3x12mm - First balloon inflation max pressure = 12 jed. First balloon inflation duration = 15 seconds. Second inflation of balloon - Max pressure = 12 jed. 2nd Inflation of balloon - Duration = 15 seconds. 2nd inflation was done at 10:15 EST. JL   10:27 AM Balloon Inflated Baln Dil Nctrekneo Rapdxng 4x12mm - First balloon inflation max pressure = 15 jed. First balloon inflation duration = 10 seconds. Second inflation of balloon - Max pressure = 18 jed. 2nd Inflation of balloon - Duration = 10 seconds. 2nd inflation was done at 10:29 EST. Third inflation of balloon - Max pressure = 18 jed. 3rd  "Inflation of balloon - Duration = 10 seconds. 3rd inflation was done at 10:29 EST. JL                 Hemodynamics    LV pressures (S/D/E) : 138/-4/10 mmHg     AO pressures (S/D/M) : 103/60/78 mmHg    LV-AO pressure gradient (peak-peak): 35 mmHg  LV O2 saturation: 103%       Complications      Complications documented before study signed (11/19/2023 11:19 AM)       No complications were associated with this study.   Documented by Chaitanya Linn MD - 11/19/2023 11:17 AM         Radiation     Event Details User   10:38 AM Radiation Tracking Panel 1: Chaitanya Linn MD  Cumulative Air Kerma (mGy) = 1080.000; Fluoro Time (min) = 21.4 JL        Total Contrast    Medication Name Total Dose   iopamidol (ISOVUE-370) 76 % injection 180 mL         Patient Hx Of Height, Weight, and Vitals    Height Weight BSA (Calculated - sq m) BMI (Calculated) Retired BMI (kg/m2) Pulse BP   167.6 cm (66\") 86.2 kg (190 lb)    84 161/106       Admission Information    Admission Date/Time Discharge Date/Time Room/Bed   11/19/23  0931  N605/1       Medications  (Filter: Administrations occurring from 0000 to 1119 on 11/19/23)   important  Continuous medications are totaled by the amount administered until 11/19/23 1119.       fentaNYL citrate (PF) (SUBLIMAZE) injection (mcg)   Total dose: 50 mcg  Date/Time Rate/Dose/Volume Action    11/19/23 0942 25 mcg Given    0943 25 mcg Given      midazolam (VERSED) injection (mg)   Total dose: 4 mg  Date/Time Rate/Dose/Volume Action    11/19/23 0942 1 mg Given    0943 1 mg Given    0953 2 mg Given      lidocaine PF 1% (XYLOCAINE) injection (mL)   Total volume: 10.5 mL  Date/Time Rate/Dose/Volume Action    11/19/23 0943 0.5 mL Given    0951 10 mL Given      heparin (porcine) injection (Units)   Total dose: 10,500 Units  Date/Time Rate/Dose/Volume Action    11/19/23 0945 5,000 Units Given    0956 5,500 Units Given      nitroglycerin 100 mcg/mL in D5W syringe (mcg)   Total dose: 200 mcg  Date/Time " Rate/Dose/Volume Action    11/19/23 0945 200 mcg Given      niCARdipine HCl in NaCl (CARDENE) 1-0.9 MG/10ML-% syringe solution prefilled syringe (mcg)   Total dose: 200 mcg  Date/Time Rate/Dose/Volume Action    11/19/23 0945 200 mcg Given      LORazepam (ATIVAN) injection (mg)   Total dose: 2 mg  Date/Time Rate/Dose/Volume Action    11/19/23 1011 1 mg Given    1018 1 mg Given      iopamidol (ISOVUE-370) 76 % injection (mL)   Total volume: 180 mL  Date/Time Rate/Dose/Volume Action    11/19/23 1037 180 mL Given      clopidogrel (PLAVIX) tablet (mg)   Total dose: 600 mg  Date/Time Rate/Dose/Volume Action    11/19/23 1041 600 mg Given        Supplies    Name ID Temporary Type Charge Code Description Charge Code Quantity   KT CONTRST INJ ISOL/MANFLD W/TRANSDCR 641 No Supply HC OR SUPPLY SHELL 270/NO CPT 468187 1   KT CONTRL HND ACIST CVI MARINA HIPRESS 65 4375 No Supply   1   CVR PROB ULTRASND/TRANSD W/GEL 7X11IN STRL 96160 No Supply   1   CATH GUIDELINER 6F 135CM 65801 No Diagnostic Catheter HC OR SUPPLY SHELL 278/ 453693 1   CATH AFTAB GUIDE FINECRS MG 1.8TO2.6F 130CM 96496 No Diagnostic Catheter HC OR SUPPLY SHELL 278/ 034489 1   ST INF MJ SMRTSTE 20DRP 2VLV 24ML 117 52908 No Supply HC OR SUPPLY SHELL 272/NO CPT 258574 1   ST INF 2NDARY 20DRP VNT/NOVNT 30IN 24827 No Supply HC OR SUPPLY SHELL 272/NO CPT 248815 1   CATH DIAG EXPO .045 FL3  5F 100CM 15298 No Diagnostic Catheter HC OR SUPPLY SHELL 272/NO CPT 366353 1   ST ACC MICROPUNCTURE .018 TRANSLSS/SS/TP 5F/10CM 21G/7CM 04556 No Supply HC OR SUPPLY SHELL 272/ 738041 1   DEV INFL MONARCH 25W 22832 No Supply HC OR SUPPLY SHELL 272/NO CPT 759626 1   CATH GUIDE MACH I NO/SH 6F CLS3.5 32551 No Guide Catheter HC OR SUPPLY SHELL 278/ 266535 1   CATH GUIDE MACH I SD HL 6F FR4 88955 No Guide Catheter HC OR SUPPLY SHELL 278/ 805613 1   GW PT 2 MS .014 185[place] [address] No Guidewire HC OR SUPPLY SHELL 734/ 867761 1   SHEATH INTRO PINN CORNRY .038  6F10CM 81460 No Supply HC OR SUPPLY SHELL 272/ 906365 1   DEV CLS VASC ANGIOSEAL VIP PLTFRM 6FR 38392 No Hemostasis Device HC OR SUPPLY SHELL 278/ 421928 1   INTRO GLIDESHEATH SLENDER SS 21G .021 6F16CM 92713 No Supply HC OR SUPPLY SHELL 272/ 132317 1   GW PERIPH GUIDERIGHT STD/EXCHNG/J/TIP SS 0.035IN 9V074UZ 84980 No Guidewire HC OR SUPPLY SHELL 272/ 456834 1   PK CATH CARD 10 85588 No Supply   1   CATH DIAG EXPO M/ PK 5F FL4/FR4 PIG 72836 No Diagnostic Catheter HC OR SUPPLY SHELL 272/NO CPT 522446 1   GW SIONBLUE STR 190CM 581886 No Guidewire HC OR SUPPLY SHELL 272/ 602048 1   ELECTRD DEFIB ZOLL/CONN RADIOPRNT LG/BKPAD A/ 593920 No Supply   1   BALN DIL NCTREKNEO RAPDXNG 2X12MM 436120 No Balloon HC OR SUPPLY SHELL 278/ 919684 0   BALN DIL NCTREKNEO RAPDXNG 3X12MM 205187 No Balloon HC OR SUPPLY SHELL 278/ 501105 1   BALN DIL NCTREKNEO RAPDXNG 4X12MM 101354 No Balloon HC OR SUPPLY SHELL 278/ 689606 1       Signed    Electronically signed by Chaitanya Linn MD on 11/19/23 at 1119 EST            Link to Procedure Log    Procedure Log        Order-Level Documents:                   Results Routing Tracking    View Results Routing Information     Order Report     Order Details

## 2023-11-20 NOTE — NURSING NOTE
Referral received for Phase II Cardiac Rehab.  Staff reviewed chart and patient has qualifying diagnosis for Phase II Cardiac Rehab.  Patient in contact precautions upon visit for r/u cdiff. Robson follow up with patient at later time.

## 2023-11-20 NOTE — PROGRESS NOTES
"                  Clinical Nutrition   Nutrition Support Assessment  Reason for Visit: MST score 2+, Unintentional weight loss, Reduced oral intake      Patient Name: Fatemeh Howard  YOB: 1960  MRN: [id number]  Date of Encounter: 11/20/23 11:41 EST  Admission date: 11/19/2023    Comments:    Ordered Boost GC (based on availability) w/lunch    Nutrition Assessment   Admission Diagnosis:  CAD (coronary artery disease), native coronary artery [I25.10]      Problem List:    Coronary artery disease of bypass graft of native heart with stable angina pectoris    Anxiety    Acute diarrhea    Primary hypertension    Tobacco abuse    Mixed hyperlipidemia    CAD (coronary artery disease), native coronary artery        PMH:   She  has no past medical history on file.    PSH:  She  has no past surgical history on file.    Applicable Nutrition Concerns:   Skin:  Oral: edentulous +dentures  GI:    Applicable Interval History:         Reported/Observed/Food/Nutrition Related History:     Pt reports poor po intake x 2 months 2/2 loss of appetite. Pt states she has felt sick, also endorses anxiety which has impacted intake. Pt reports 20lb wt loss x 2 months, however limited wt hx available per EMR to verify. Pt is edentulous, has dentures that currently fit and denies need for modified textures. Pt agreeable to ONS, will send. NKFA.    Anthropometrics     Flowsheet Rows      Flowsheet Row First Filed Value   Admission Height 167.6 cm (66\") Documented at 11/19/2023 1100   Admission Weight 86.2 kg (190 lb) Documented at 11/19/2023 0941          Height: Height: 167 cm (65.75\")  Last Filed Weight: Weight: 86 kg (189 lb 9.5 oz) (11/19/23 1554)  Method: Weight Method: Estimated  BMI: BMI (Calculated): 30.8  BMI classification: Obese Class I: 30-34.9kg/m2  IBW:      UBW:    Weight change:      Weight      Weight (kg) Weight (lbs) Weight Method   11/19/2023 86 kg  189 lb 9.5 oz  Estimated     86.183 kg  190 lb         Nutrition " Focused Physical Exam     Date:   11/20      NFPE completed, patient does not meet criteria for MSA at this time.     Current Nutrition Prescription   PO: Diet: Cardiac Diets; Healthy Heart (2-3 Na+); Texture: Regular Texture (IDDSI 7); Fluid Consistency: Thin (IDDSI 0)  Oral Nutrition Supplement:   Intake: 0% x 2 meals      Nutrition Diagnosis   Date:  11/20            Updated:    Problem Predicted suboptimal energy intake   Etiology anxiety   Signs/Symptoms Per pt report   Status:     Date:  11/20 Updated:  Problem Unintended weight loss   Etiology anxiety   Signs/Symptoms 20lb wt loss x 2-3 months per pt report   Status:    Goal:   General: Nutrition to support treatment  PO: Establish PO, Increase intake  EN/PN: N/A    Nutrition Intervention      Follow treatment progress, Care plan reviewed, Interview for preferences, Encourage intake, Supplement provided    Boost GC (kylah) daily w/lunch    Monitoring/Evaluation:   Per protocol, PO intake, Supplement intake, Weight      Yolette Schmidt, MS,RD,LD  Time Spent: 25

## 2023-11-20 NOTE — PLAN OF CARE
Goal Outcome Evaluation:  Plan of Care Reviewed With: patient   VS stable, pt on RA for most of the day, placed on 2 L NC when asleep. Pt complains of sinus pressure that she says is not sinus congestion. Pt reluctant to take medicine to help with anxiety, and skeptical of her medication regimen. Explained to pt importance of taking her medications. Still pending stool panel.      Progress: no change

## 2023-11-20 NOTE — DISCHARGE SUMMARY
"  [facility]  PROGRESS NOTE    Date of Admission: 11/19/2023  Date of Service: 11/20/23    Primary Care Physician: Provider, No Known    Chief Complaint: follow up CAD    Problem List:   Coronary artery disease of bypass graft of native heart with stable angina pectoris    Anxiety    Acute diarrhea    Primary hypertension    Tobacco abuse    Mixed hyperlipidemia    CAD (coronary artery disease), native coronary artery      Subjective      No acute events overnight. No angina or CHF symptoms. Continues to have belching and diarrhea. BP well controlled. Radial access site CDI.       Objective   Vitals: /82 (BP Location: Left arm, Patient Position: Lying)   Pulse 92   Temp 97.8 °F (36.6 °C) (Oral)   Resp 20   Ht 167 cm (65.75\")   Wt 86 kg (189 lb 9.5 oz)   SpO2 94%   BMI 30.84 kg/m²     Physical Exam:  GENERAL:  in no acute distress.   HEENT: no jugular venous distention  HEART: Regular rhythm, normal rate, and no murmurs, gallops, or rubs.   LUNGS: Clear to auscultation bilaterally. No wheezing, rales or rhonchi.  EXTREMITIES: No clubbing, cyanosis, or edema noted. Radial access site CDI.     Results:  Results from last 7 days   Lab Units 11/19/23  1002   WBC 10*3/mm3 13.56*   HEMOGLOBIN g/dL 14.7   HEMATOCRIT % 44.8   PLATELETS 10*3/mm3 262     Results from last 7 days   Lab Units 11/20/23  0521 11/19/23  2336 11/19/23  1217 11/19/23  1002   SODIUM mmol/L 132*  --  139 139   POTASSIUM mmol/L 4.3 4.3 3.3* 2.7*   CHLORIDE mmol/L 100  --  100 101   CO2 mmol/L 25.0  --  27.0 23.0   BUN mg/dL 7*  --  10 11   CREATININE mg/dL 0.46*  --  0.54* 0.59   GLUCOSE mg/dL 135*  --  168* 239*      Lab Results   Component Value Date    CHOL 195 11/19/2023    TRIG 143 11/19/2023    HDL 29 (L) 11/19/2023     (H) 11/19/2023    AST 12 11/19/2023    ALT 9 11/19/2023     Results from last 7 days   Lab Units 11/20/23  0521   HEMOGLOBIN A1C % 5.60     Results from last 7 days   Lab " Units 11/19/23  1002   CHOLESTEROL mg/dL 195   TRIGLYCERIDES mg/dL 143   HDL CHOL mg/dL 29*   LDL CHOL mg/dL 140*             Results from last 7 days   Lab Units 11/19/23  1217   PROTIME Seconds 14.8*   INR  1.14*   APTT seconds 164.8*     Results from last 7 days   Lab Units 11/20/23  0521 11/19/23  1217 11/19/23  1002   HSTROP T ng/L 1,988* 9 6             Intake/Output Summary (Last 24 hours) at 11/20/2023 1124  Last data filed at 11/20/2023 0325  Gross per 24 hour   Intake --   Output 1250 ml   Net -1250 ml       I personally reviewed the patient's EKG/Telemetry data    Radiology Data:      Current Medications:  aspirin, 81 mg, Oral, Daily  clopidogrel, 75 mg, Oral, Daily  insulin lispro, 2-7 Units, Subcutaneous, 4x Daily AC & at Bedtime  losartan, 25 mg, Oral, Daily  metoprolol tartrate, 25 mg, Oral, Q12H  nicotine, 1 patch, Transdermal, Daily  pantoprazole, 40 mg, Oral, Q AM  pharmacy consult - MTM, , Does not apply, Daily  rosuvastatin, 20 mg, Oral, Nightly      sodium chloride, 75 mL/hr, Last Rate: 75 mL/hr (11/19/23 1517)        Assessment and Plan:   1.  CAD:  Not a STEMI  Status post PCI of the proximal to mid circumflex with a 4.0 drug-eluting stent  Aspirin 81 mg for life  Clopidogrel 75 mg daily for 6 months  High intensity statin therapy initiated  Metoprolol 25mg BID started yesterday.  Tobacco cessation recommended as well as cardiac rehab  Echocardiogram is pending.      2.  Tobacco abuse:  Discussed the importance complete tobacco cessation, assistance will be offered.     3.  Hypertension:  Goal blood pressure less than 130/80  Continue losartan 25mg daily and lopressor 25mg BID.      4.  Hyperlipidemia:  Goal LDL less than 50  Crestor 20mg daily initiated this admission     5.  Diarrhea and belching:  hospitalist following for appropriate work-up  She also has mild leukocytosis.  Add protonix 40mg daily.   Replace potassium per protocol.       DC home when all agree. Follow up with Dr. Ray  in 6 weeks in our [place] office.      Betsey Nicole PA-C

## 2023-11-20 NOTE — CASE MANAGEMENT/SOCIAL WORK
Discharge Planning Assessment  [facility]     Patient Name: Fatemeh [place]  MRN: [id number]  Today's Date: 11/20/2023    Admit Date: 11/19/2023    Plan: discharge plan   Discharge Needs Assessment       Row Name 11/20/23 1510       Living Environment    People in Home alone    Primary Care Provided by self    Provides Primary Care For no one    Family Caregiver if Needed child(gayla), adult    Family Caregiver Names Shanelle [place](son)    Quality of Family Relationships helpful;involved;supportive    Able to Return to Prior Arrangements yes    Living Arrangement Comments Pt dozing. I spoke with pt's sonShanelle on phone. Pt resides in [facility] in an apartment alone.       Transition Planning    Patient/Family Anticipates Transition to home    Patient/Family Anticipated Services at Transition     Transportation Anticipated car, drives self;family or friend will provide       Discharge Needs Assessment    Readmission Within the Last 30 Days no previous admission in last 30 days    Equipment Currently Used at Home none    Concerns to be Addressed discharge planning    Equipment Needed After Discharge none    Discharge Coordination/Progress Per sonShanelle, pt has [facility] with prescription cover and uses [facility] in [place]. Pt is independent with ADDLs uses no DME for mobility or HH. 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 Cardiology following. Plan is home and son states family can transport and assist at home as needed. No further discharge needs. 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 Cardiology following. Plan is home and son states family can transport and assist at home as needed. No further  discharge needs. CM will cont to follow    Final Discharge Disposition Code 01 - home or self-care                  Continued Care and Services - Admitted Since 11/19/2023    Coordination has not been started for this encounter.       Expected Discharge Date and Time       Expected Discharge Date Expected Discharge Time    Nov 20, 2023            Demographic Summary       Row Name 11/20/23 1506       General Information    General Information Comments PCP is Dr Murphy       Contact Information    Permission Granted to Share Info With     Contact Information Obtained for     Contact Information Comments Shanelle [place](son) [phone]                   Functional Status       Row Name 11/20/23 1507       Functional Status    Functional Status Comments Per son, pt is independent with ADLs                   Psychosocial    No documentation.                  Abuse/Neglect    No documentation.                  Legal    No documentation.                  Substance Abuse    No documentation.                  Patient Forms    No documentation.                     Hanna Blanco RN

## 2023-11-21 VITALS
SYSTOLIC BLOOD PRESSURE: 129 MMHG | HEART RATE: 72 BPM | WEIGHT: 189.6 LBS | OXYGEN SATURATION: 90 % | DIASTOLIC BLOOD PRESSURE: 79 MMHG | HEIGHT: 66 IN | RESPIRATION RATE: 15 BRPM | TEMPERATURE: 98.8 F | BODY MASS INDEX: 30.47 KG/M2

## 2023-11-21 PROBLEM — I25.118 CORONARY ARTERY DISEASE OF NATIVE ARTERY OF NATIVE HEART WITH STABLE ANGINA PECTORIS: Status: ACTIVE | Noted: 2023-11-19

## 2023-11-21 LAB
ADV 40+41 DNA STL QL NAA+NON-PROBE: NOT DETECTED
ANION GAP SERPL CALCULATED.3IONS-SCNC: 8 MMOL/L (ref 5–15)
ASTRO TYP 1-8 RNA STL QL NAA+NON-PROBE: NOT DETECTED
BUN SERPL-MCNC: 12 MG/DL (ref 8–23)
BUN/CREAT SERPL: 19 (ref 7–25)
C CAYETANENSIS DNA STL QL NAA+NON-PROBE: NOT DETECTED
C COLI+JEJ+UPSA DNA STL QL NAA+NON-PROBE: NOT DETECTED
C DIFF TOX GENS STL QL NAA+PROBE: NOT DETECTED
CALCIUM SPEC-SCNC: 8.8 MG/DL (ref 8.6–10.5)
CHLORIDE SERPL-SCNC: 105 MMOL/L (ref 98–107)
CO2 SERPL-SCNC: 27 MMOL/L (ref 22–29)
CREAT SERPL-MCNC: 0.63 MG/DL (ref 0.57–1)
CRYPTOSP DNA STL QL NAA+NON-PROBE: NOT DETECTED
E HISTOLYT DNA STL QL NAA+NON-PROBE: NOT DETECTED
EAEC PAA PLAS AGGR+AATA ST NAA+NON-PRB: NOT DETECTED
EC STX1+STX2 GENES STL QL NAA+NON-PROBE: NOT DETECTED
EGFRCR SERPLBLD CKD-EPI 2021: 99.8 ML/MIN/1.73
EPEC EAE GENE STL QL NAA+NON-PROBE: NOT DETECTED
ETEC LTA+ST1A+ST1B TOX ST NAA+NON-PROBE: NOT DETECTED
G LAMBLIA DNA STL QL NAA+NON-PROBE: NOT DETECTED
GLUCOSE BLDC GLUCOMTR-MCNC: 116 MG/DL (ref 70–130)
GLUCOSE BLDC GLUCOMTR-MCNC: 87 MG/DL (ref 70–130)
GLUCOSE SERPL-MCNC: 121 MG/DL (ref 65–99)
NOROVIRUS GI+II RNA STL QL NAA+NON-PROBE: NOT DETECTED
P SHIGELLOIDES DNA STL QL NAA+NON-PROBE: NOT DETECTED
POTASSIUM SERPL-SCNC: 3.6 MMOL/L (ref 3.5–5.2)
QT INTERVAL: 408 MS
QTC INTERVAL: 452 MS
RVA RNA STL QL NAA+NON-PROBE: NOT DETECTED
S ENT+BONG DNA STL QL NAA+NON-PROBE: NOT DETECTED
SAPO I+II+IV+V RNA STL QL NAA+NON-PROBE: NOT DETECTED
SHIGELLA SP+EIEC IPAH ST NAA+NON-PROBE: NOT DETECTED
SODIUM SERPL-SCNC: 140 MMOL/L (ref 136–145)
V CHOL+PARA+VUL DNA STL QL NAA+NON-PROBE: NOT DETECTED
V CHOLERAE DNA STL QL NAA+NON-PROBE: NOT DETECTED
Y ENTEROCOL DNA STL QL NAA+NON-PROBE: NOT DETECTED

## 2023-11-21 PROCEDURE — 87507 IADNA-DNA/RNA PROBE TQ 12-25: CPT | Performed by: NURSE PRACTITIONER

## 2023-11-21 PROCEDURE — 99232 SBSQ HOSP IP/OBS MODERATE 35: CPT | Performed by: STUDENT IN AN ORGANIZED HEALTH CARE EDUCATION/TRAINING PROGRAM

## 2023-11-21 PROCEDURE — 87493 C DIFF AMPLIFIED PROBE: CPT | Performed by: NURSE PRACTITIONER

## 2023-11-21 PROCEDURE — 99239 HOSP IP/OBS DSCHRG MGMT >30: CPT | Performed by: INTERNAL MEDICINE

## 2023-11-21 PROCEDURE — 97530 THERAPEUTIC ACTIVITIES: CPT

## 2023-11-21 PROCEDURE — 97110 THERAPEUTIC EXERCISES: CPT

## 2023-11-21 PROCEDURE — 97116 GAIT TRAINING THERAPY: CPT

## 2023-11-21 PROCEDURE — 82948 REAGENT STRIP/BLOOD GLUCOSE: CPT

## 2023-11-21 PROCEDURE — 80048 BASIC METABOLIC PNL TOTAL CA: CPT | Performed by: NURSE PRACTITIONER

## 2023-11-21 PROCEDURE — 97161 PT EVAL LOW COMPLEX 20 MIN: CPT

## 2023-11-21 RX ORDER — METOPROLOL SUCCINATE 50 MG/1
25 TABLET, EXTENDED RELEASE ORAL DAILY
Qty: 90 TABLET | Refills: 3 | Status: SHIPPED | OUTPATIENT
Start: 2023-11-21 | End: 2023-11-22

## 2023-11-21 RX ORDER — NITROGLYCERIN 0.4 MG/1
0.4 TABLET SUBLINGUAL
Qty: 25 TABLET | Refills: 12 | Status: SHIPPED | OUTPATIENT
Start: 2023-11-21 | End: 2023-11-22 | Stop reason: SDUPTHER

## 2023-11-21 RX ORDER — ROSUVASTATIN CALCIUM 20 MG/1
20 TABLET, COATED ORAL NIGHTLY
Qty: 90 TABLET | Refills: 3 | Status: SHIPPED | OUTPATIENT
Start: 2023-11-21 | End: 2023-11-22 | Stop reason: SDUPTHER

## 2023-11-21 RX ORDER — POTASSIUM CHLORIDE 1.5 G/1.58G
40 POWDER, FOR SOLUTION ORAL EVERY 4 HOURS
Status: DISCONTINUED | OUTPATIENT
Start: 2023-11-21 | End: 2023-11-21 | Stop reason: HOSPADM

## 2023-11-21 RX ORDER — CLOPIDOGREL BISULFATE 75 MG/1
75 TABLET ORAL DAILY
Qty: 90 TABLET | Refills: 3 | Status: SHIPPED | OUTPATIENT
Start: 2023-11-22 | End: 2023-11-22 | Stop reason: SDUPTHER

## 2023-11-21 RX ORDER — HYDROXYZINE HYDROCHLORIDE 25 MG/1
25 TABLET, FILM COATED ORAL 3 TIMES DAILY PRN
Qty: 60 TABLET | Refills: 3 | Status: SHIPPED | OUTPATIENT
Start: 2023-11-21 | End: 2023-11-22 | Stop reason: SDUPTHER

## 2023-11-21 RX ORDER — PANTOPRAZOLE SODIUM 40 MG/1
40 TABLET, DELAYED RELEASE ORAL
Qty: 20 TABLET | Refills: 0 | Status: SHIPPED | OUTPATIENT
Start: 2023-11-22 | End: 2023-11-22 | Stop reason: SDUPTHER

## 2023-11-21 RX ORDER — NICOTINE 21 MG/24HR
1 PATCH, TRANSDERMAL 24 HOURS TRANSDERMAL DAILY
Qty: 90 EACH | Refills: 3 | Status: SHIPPED | OUTPATIENT
Start: 2023-11-22 | End: 2023-11-22 | Stop reason: SDUPTHER

## 2023-11-21 RX ORDER — POTASSIUM CHLORIDE 20 MEQ/1
40 TABLET, EXTENDED RELEASE ORAL EVERY 4 HOURS
Status: DISCONTINUED | OUTPATIENT
Start: 2023-11-21 | End: 2023-11-21

## 2023-11-21 RX ORDER — ASPIRIN 81 MG/1
81 TABLET ORAL DAILY
Qty: 90 TABLET | Refills: 3 | Status: SHIPPED | OUTPATIENT
Start: 2023-11-22 | End: 2023-11-22 | Stop reason: SDUPTHER

## 2023-11-21 RX ADMIN — ALPRAZOLAM 0.5 MG: 0.5 TABLET ORAL at 15:03

## 2023-11-21 RX ADMIN — PANTOPRAZOLE SODIUM 40 MG: 40 TABLET, DELAYED RELEASE ORAL at 05:25

## 2023-11-21 RX ADMIN — ALPRAZOLAM 0.5 MG: 0.5 TABLET ORAL at 00:07

## 2023-11-21 RX ADMIN — LOSARTAN POTASSIUM 25 MG: 25 TABLET, FILM COATED ORAL at 08:29

## 2023-11-21 RX ADMIN — ASPIRIN 81 MG: 81 TABLET, COATED ORAL at 08:29

## 2023-11-21 RX ADMIN — CLOPIDOGREL BISULFATE 75 MG: 75 TABLET ORAL at 08:30

## 2023-11-21 RX ADMIN — POTASSIUM CHLORIDE 40 MEQ: 1.5 FOR SOLUTION ORAL at 09:05

## 2023-11-21 RX ADMIN — METOPROLOL TARTRATE 25 MG: 25 TABLET, FILM COATED ORAL at 08:29

## 2023-11-21 RX ADMIN — Medication 1 PATCH: at 08:30

## 2023-11-21 NOTE — PAYOR COMM NOTE
"Fatemeh [place] (63 y.o. Female)     Vickie Patel, RN  Utilization Review  [phone]  [phone]        Date of Birth   1960    Social Security Number       Address   [address]  [place] [address]    Home Phone   [phone]    MRN   [id number]       Judaism   None    Marital Status   Single                            Admission Date   11/19/23    Admission Type   Urgent    Admitting Provider   Chaitanya Linn MD    Attending Provider   Chaitanya Linn MD    Department, Room/Bed   [facility] 6A, N605/1       Discharge Date       Discharge Disposition   Home or Self Care    Discharge Destination                                 Attending Provider: Chaitanya Linn MD    Allergies: No Known Allergies    Isolation: Spore   Infection: C.difficile (rule out) (11/19/23)   Code Status: CPR    Ht: 167 cm (65.75\")   Wt: 86 kg (189 lb 9.5 oz)    Admission Cmt: None   Principal Problem: Coronary artery disease of native artery of native heart with stable angina pectoris [I25.118]                   Active Insurance as of 11/19/2023       Primary Coverage       Payor Plan Insurance Group Employer/Plan Group    [employer] [place] [employer] [place]ICA[place]        Payor Plan Address Payor Plan Phone Number Payor Plan Fax Number Effective Dates    [address] [address] [phone]  1/5/2021 - None Entered    [facility] [id number]         Subscriber Name Subscriber Birth Date Member ID       FATEMEH [place] 1960 [id number]                     Emergency Contacts        (Rel.) Home Phone Work Phone Mobile Phone    DYLON [place] (Son) [phone] -- --                 Discharge Summary        Chaitanya Linn MD at 11/21/23 1120            Date of Discharge:  11/21/2023    Discharge Diagnosis: Inferior STEMI    Presenting Problem/History of Present Illness  CAD (coronary artery disease), native coronary artery [I25.10]    Inferior STEMI, anxiety, hypertension, hyperlipidemia, tobacco " abuse    Hospital Course  Patient is a 63 y.o. female presented with palpitations and pounding heartbeat, EKG at [facility] showed inferior ST elevation therefore she presented for emergent cardiac catheterization at [facility] on 11/19/2023.  She was found to have  of a codominant RCA and proximal to mid codominant left circumflex 80% tandem lesions status post PCI with a 4.0 mm BERENICE.  LAD relatively normal.  EF by echo 40 to 45% with basal inferior akinesis.  She was counseled importance of tobacco cessation and she was prescribed nicotine patches and Nicorette gum.  She has a lot of anxiety and hospital medicine were referred her to psychiatry as an outpatient.  I discussed in detail the importance of medication compliance especially aspirin and Plavix as well as smoking cessation exercise and cardiac rehab.  She initially had diarrhea and mild leukocytosis but white count normalized and diarrhea improved during hospitalization.  Hospitalist was consulted for management.  She will follow-up with me in 2-3 months.    Procedures Performed  Procedure(s):  Left Heart Cath  Stent BERENICE coronary       Results for orders placed during the hospital encounter of 11/19/23    Cardiac Catheterization/Vascular Study    Narrative    False STEMI, 100%  of the medium caliber codominant RCA with mature left-to-right collaterals    Codominant large circumflex tandem 80% stenoses of the proximal and mid status post PCI with a 4.0 x 23 mm Xience BERENICE.    Scattered mid to distal LAD 10-20%.  Normal left main.    LV pressures (S/D/E) : 138/-4/10 mmHg    The ejection fraction was greater than 55% by visual estimate.  With akinesis of the basal inferior segment.    Mild aortic stenosis may be present with a peak to peak gradient of 35 mmHg, we will check an echocardiogram    Aspirin 81 mg for life, clopidogrel 75 mg daily for no less than 6 months       Consults:   Consults       Date and Time Order Name Status  "Description    11/19/2023 10:57 AM Inpatient Hospitalist Consult Completed             Pertinent Test Results:   Results for orders placed during the hospital encounter of 11/19/23    Adult Transthoracic Echo Complete W/ Cont if Necessary Per Protocol    Interpretation Summary    Left ventricular systolic function is moderately decreased. Calculated left ventricular EF = 37.9% Left ventricular ejection fraction appears to be 41 - 45%.    The following left ventricular wall segments are hypokinetic: basal anterolateral, mid anterolateral, apical lateral and mid inferolateral. The following left ventricular wall segments are akinetic: basal inferolateral.    Left ventricular diastolic function is consistent with (grade Ia w/high LAP) impaired relaxation.    Estimated right ventricular systolic pressure from tricuspid regurgitation is normal (<35 mmHg).    No significant structural or functional valvular disease.        Condition on Discharge: Stable    Physical Exam at Discharge    Vital Signs  /77 (BP Location: Right arm, Patient Position: Lying)   Pulse 75   Temp 98.8 °F (37.1 °C) (Oral)   Resp 15   Ht 167 cm (65.75\")   Wt 86 kg (189 lb 9.5 oz)   SpO2 91%   BMI 30.84 kg/m²       Physical Exam:  General Appearance:   · well developed  · well nourished  HENT:   · oropharynx moist  · lips not cyanotic  Neck:  · thyroid not enlarged  · supple  Respiratory:  · no respiratory distress  · normal breath sounds  · no rales  Cardiovascular:  · no jugular venous distention  · regular rhythm  · apical impulse normal  · S1 normal, S2 normal  · no S3, no S4   · no murmur  · no rub, no thrill  · carotid pulses normal; no bruit  · pedal pulses normal  · lower extremity edema: none    Gastrointestinal:   · bowel sounds normal  · non-tender  · no hepatomegaly, no splenomegaly  Musculoskeletal:  · no clubbing of fingers.   · normocephalic, head atraumatic  Skin:   · warm  · dry  Psychiatric:  · judgement and insight " appropriate  · normal mood and affect      Discharge Disposition  Home or Self Care    Discharge Medications     Discharge Medications        New Medications        Instructions Start Date   aspirin 81 MG EC tablet   81 mg, Oral, Daily   Start Date: November 22, 2023     clopidogrel 75 MG tablet  Commonly known as: PLAVIX   75 mg, Oral, Daily   Start Date: November 22, 2023     hydrOXYzine 25 MG tablet  Commonly known as: ATARAX   25 mg, Oral, 3 Times Daily PRN      nicotine 21 MG/24HR patch  Commonly known as: NICODERM CQ   1 patch, Transdermal, Daily   Start Date: November 22, 2023     nicotine polacrilex 4 MG gum  Commonly known as: NICORETTE   4 mg, Mouth/Throat, Every 1 Hour PRN      nitroglycerin 0.4 MG SL tablet  Commonly known as: NITROSTAT   0.4 mg, Sublingual, Every 5 Minutes PRN, Take no more than 3 doses in 15 minutes.      pantoprazole 40 MG EC tablet  Commonly known as: PROTONIX   40 mg, Oral, Every Early Morning   Start Date: November 22, 2023     rosuvastatin 20 MG tablet  Commonly known as: CRESTOR   20 mg, Oral, Nightly             Changes to Medications        Instructions Start Date   metoprolol succinate XL 50 MG 24 hr tablet  Commonly known as: TOPROL-XL  What changed: medication strength   25 mg, Oral, Daily             Continue These Medications        Instructions Start Date   albuterol sulfate  (90 Base) MCG/ACT inhaler  Commonly known as: PROVENTIL HFA;VENTOLIN HFA;PROAIR HFA   2 puffs, Inhalation, Every 4 Hours PRN      busPIRone 5 MG tablet  Commonly known as: BUSPAR   5 mg, Oral, 2 Times Daily      citalopram 20 MG tablet  Commonly known as: CeleXA   20 mg, Oral, Daily      Fluticasone-Salmeterol 100-50 MCG/ACT DISKUS  Commonly known as: ADVAIR/WIXELA   1 puff, Inhalation, 2 Times Daily PRN      lisinopril 20 MG tablet  Commonly known as: PRINIVIL,ZESTRIL   20 mg, Oral, Daily               Discharge Diet: Cardiac    Activity at Discharge: As tolerated    Follow-up  Appointments  No future appointments.  Additional Instructions for the Follow-ups that You Need to Schedule       Discharge Follow-up with Specialty: Chaitanya Linn MD, [facility] [phone]; 2 Months   As directed      Specialty: Chaitanya Linn MD, [facility] [phone]   Follow Up: 2 Months   Follow Up Details: Hospital FU for pci                Test Results Pending at Discharge  Pending Labs       Order Current Status    Clostridioides difficile Toxin - Stool, Per Rectum In process    Clostridioides difficile Toxin, PCR - Stool, Per Rectum In process    Gastrointestinal Panel, PCR - Stool, Per Rectum In process              Time: I spent  42  minutes on this discharge activity which included: face-to-face encounter with the patient, reviewing the data in the system, coordination of the care with the nursing staff as well as consultants, documentation, and entering orders.       Chaitanya Linn MD  11/21/23  11:20 EST              Electronically signed by Chaitanya Linn MD at 11/21/23 1127

## 2023-11-21 NOTE — DISCHARGE INSTR - APPOINTMENTS
Mental Health services with [facility]  Address: [address] [place] [address] [address], [place] [id number] Phone: [phone] on Tue 11/28 @3pm with RUDY Lam.

## 2023-11-21 NOTE — CASE MANAGEMENT/SOCIAL WORK
Continued Stay Note  [facility]     Patient Name: Fatemeh [place]  MRN: [id number]  Today's Date: 11/21/2023    Admit Date: 11/19/2023    Plan:    Discharge Plan       Row Name 11/21/23 1455       Plan    Plan SW    Plan Comments SW'er spoke with patient about arranging mental health services. 'er arranged Mental Health services with [facility]  Address: [address] [place] [address] Phone: [phone] on Tue 11/28 @3pm with RUDY Lam.                Discharge Codes    No documentation.                 Expected Discharge Date and Time       Expected Discharge Date Expected Discharge Time    Nov 21, 2023               TONIO [place] (Kay)

## 2023-11-21 NOTE — THERAPY EVALUATION
Patient Name: Fatemeh Howard  : 1960    MRN: [id number]                              Today's Date: 2023       Admit Date: 2023    Visit Dx: No diagnosis found.  Patient Active Problem List   Diagnosis    Coronary artery disease of native artery of native heart with stable angina pectoris    Anxiety    Acute diarrhea    Primary hypertension    Tobacco abuse    Mixed hyperlipidemia    CAD (coronary artery disease), native coronary artery     History reviewed. No pertinent past medical history.  Past Surgical History:   Procedure Laterality Date    CARDIAC CATHETERIZATION N/A 2023    Procedure: Left Heart Cath;  Surgeon: Chaitanya Linn MD;  Location:  [facility];  Service: Cardiovascular;  Laterality: N/A;    CARDIAC CATHETERIZATION N/A 2023    Procedure: Stent BERENICE coronary;  Surgeon: Chaitanya Linn MD;  Location:  [facility];  Service: Cardiovascular;  Laterality: N/A;      General Information       Row Name 23 [id number]          Physical Therapy Time and Intention    Document Type evaluation  -DM     Mode of Treatment physical therapy  -DM       Row Name 23 2343          General Information    Patient Profile Reviewed yes  -DM     Prior Level of Function independent:;all household mobility;community mobility;gait;transfer;bed mobility;ADL's;home management;cooking;driving;dependent:;shopping;yard work  indep gt w/o AD; did min. cleaning;dtr did shopping; son can now do the driv.;apt provided ydwk  -DM     Existing Precautions/Restrictions other (see comments);oxygen therapy device and L/min  STEMI; : LHC,stent; uses 2L o2@night;Anx.(panics@ MD's); r/o ? C dIff (contact;spore)  -DM     Barriers to Rehab medically complex;ineffective coping  -DM       Row Name 23 0887          Living Environment    People in Home alone  -DM       Row Name 2339          Home Main Entrance    Number of Stairs, Main Entrance none  -DM        Row Name 11/21/23 0850          Stairs Within Home, Primary    Stairs, Within Home, Primary 1-st.apt  -DM     Number of Stairs, Within Home, Primary none  -DM       Row Name 11/21/23 0850          Cognition    Orientation Status (Cognition) oriented x 3  -DM       Row Name 11/21/23 0850          Safety Issues, Functional Mobility    Safety Issues Affecting Function (Mobility) safety precaution awareness;safety precautions follow-through/compliance;insight into deficits/self-awareness;sequencing abilities  -DM     Impairments Affecting Function (Mobility) endurance/activity tolerance;pain;postural/trunk control;shortness of breath;strength  -DM               User Key  (r) = Recorded By, (t) = Taken By, (c) = Cosigned By      Initials Name Provider Type    DM Purvi Young, PT Physical Therapist                   Mobility       Row Name 11/21/23 0850          Bed Mobility    Bed Mobility rolling left;rolling right;scooting/bridging;supine-sit;sit-supine  -DM     Rolling [place] (Bed Mobility) independent  -DM     Rolling Right [place] (Bed Mobility) independent  -DM     Scooting/Bridging [place] (Bed Mobility) independent  -DM     Supine-Sit [place] (Bed Mobility) standby assist  asst w/ lines, disconn & suspending hardwire tele & pulse ox (diffic gripping)  -DM     Sit-Supine [place] (Bed Mobility) standby assist  asst w/ lines & reconn hardwire tele & pulse ox  -DM     Assistive Device (Bed Mobility) head of bed elevated  -DM     Comment, (Bed Mobility) has IV, hardwire tele., o2 PRN (2L @ night);per nsg, ? Cdiff (pt stating nsg asked for stool speci, & pt now needs to have BM;Speci pan placed in toilet); issued tdsocks & placed;once EOB, BP taken & noted pt very tense/anx. w/ cuff inflating, & stating the stress causes her to be mod SOB & have near-panic attack  -DM       Row Name 11/21/23 0850          Transfers    Comment, (Transfers) cues for HP,seq (safety rail in BR); w/ 2nd gt  trial using SPC, pt needing mult cues for maintaining grasp on AD during transit mvmt  -DM       Row Name 11/21/23 0850          Sit-Stand Transfer    Sit-Stand [place] (Transfers) verbal cues;supervision  2 trials w/o AD; 2 Trials w/ SPC R hand  -DM     Assistive Device (Sit-Stand Transfers) cane, straight  -DM       Row Name 11/21/23 0850          Gait/Stairs (Locomotion)    [place] Level (Gait) verbal cues;contact guard  init decl. SPC, & preferring to restR hand onIV pump;toBR for BM (extensive toileting/hygiene > 10 min);to sink,then ret.to EOB(40ft)d/t fatigue;4 min rest,mult c/o's re: stress & gen.pain; to sup.while PT brought SPC from dept;2nd gt trial X40 ft.;HR 95  -DM     Assistive Device (Gait) cane, straight  -DM     Distance in Feet (Gait) 80 ( 40 x 2)  -DM     Deviations/Abnormal Patterns (Gait) bilateral deviations;base of support, narrow;dewayne decreased;stride length decreased;weight shifting decreased  Decr step length;wobbly;drifts L/R  -DM     Bilateral Gait Deviations forward flexed posture;heel strike decreased  -DM     Comment, (Gait/Stairs) cues for incr step length & ANNIKA, trunk ext/focusing ahead, & PLB; no chair in room,& decl. PT bringing chair for sitting trial;Desat 91% on RA (recovered to 94% w/in 2 min.);  in to assess;lengthy discussion re:smoking cess.,lifestyle changes,meds;transf to sup.& finished ther ex  -DM               User Key  (r) = Recorded By, (t) = Taken By, (c) = Cosigned By      Initials Name Provider Type    DM Purvi Young, PT Physical Therapist                   Obj/Interventions       Row Name 11/21/23 0850          Range of Motion Comprehensive    General Range of Motion bilateral lower extremity ROM WFL;upper extremity range of motion deficits identified  -DM     Comment, General Range of Motion B shldrs galvan. 15--20% d/t flexed trunk/forw.head posture, & c/o's of stiff neck/shldr & intercostal MM's  -DM       Row Name 11/21/23 0850           Strength Comprehensive (MMT)    General Manual Muscle Testing (MMT) Assessment upper extremity strength deficits identified;lower extremity strength deficits identified  -DM     Comment, General Manual Muscle Testing (MMT) Assessment BUE grossly 3- to 3+/5; BLE grossly 3+ to 4-/5  -DM       Row Name 11/21/23 0850          Motor Skills    Therapeutic Exercise shoulder;hip;knee;ankle  Issued HEP & Instructed  -DM       Row Name 11/21/23 0850          Shoulder (Therapeutic Exercise)    Shoulder (Therapeutic Exercise) AROM (active range of motion)  -DM     Shoulder AROM (Therapeutic Exercise) bilateral;flexion;extension;aBduction;aDduction;sitting;10 repetitions;other (see comments);supine  Biceps curls; deep inspirations w/ sh.exer  -DM       Row Name 11/21/23 0850          Hip (Therapeutic Exercise)    Hip (Therapeutic Exercise) AROM (active range of motion);AAROM (active assistive range of motion);isometric exercises  -DM     Hip AROM (Therapeutic Exercise) bilateral;flexion;extension;external rotation;internal rotation;sitting;10 repetitions;supine  sitting marches  -DM     Hip AAROM (Therapeutic Exercise) bilateral;aBduction;aDduction;supine;10 repetitions  Min A for abd/add  -DM     Hip Isometrics (Therapeutic Exercise) bilateral;gluteal sets;supine;10 repetitions  -DM       Row Name 11/21/23 0850          Knee (Therapeutic Exercise)    Knee (Therapeutic Exercise) AROM (active range of motion);isometric exercises;AAROM (active assistive range of motion)  -DM     Knee AROM (Therapeutic Exercise) bilateral;flexion;extension;SAQ (short arc quad);LAQ (long arc quad);heel slides;sitting;supine;10 repetitions  -DM     Knee AAROM (Therapeutic Exercise) bilateral;flexion;supine;10 repetitions  min A under heels for h.slides to prevent friction  -DM     Knee Isometrics (Therapeutic Exercise) bilateral;hamstring sets;quad sets;supine;10 repetitions  -DM       Row Name 11/21/23 0850          Ankle (Therapeutic Exercise)     Ankle (Therapeutic Exercise) AROM (active range of motion)  -DM     Ankle AROM (Therapeutic Exercise) bilateral;dorsiflexion;plantarflexion;supine;sitting;10 repetitions;2 sets;other (see comments)  AC;rocking h.to toe  -DM       Row Name 11/21/23 0850          Balance    Balance Assessment sitting static balance;sitting dynamic balance;standing static balance;standing dynamic balance  -DM     Static Sitting Balance independent  -DM     Dynamic Sitting Balance independent  -DM     Position, Sitting Balance unsupported;sitting edge of bed;other (see comments)  comm.  -DM     Static Standing Balance supervision  -DM     Dynamic Standing Balance verbal cues;contact guard  -DM     Position/Device Used, Standing Balance supported;cane, straight  -DM     Balance Interventions sitting;standing;static;dynamic;weight shifting activity  -DM               User Key  (r) = Recorded By, (t) = Taken By, (c) = Cosigned By      Initials Name Provider Type    DM Purvi Young, PT Physical Therapist                   Goals/Plan       Row Name 11/21/23 0850          Bed Mobility Goal 1 (PT)    Activity/Assistive Device (Bed Mobility Goal 1, PT) bed mobility activities, all  -DM     [place] Level/Cues Needed (Bed Mobility Goal 1, PT) independent  -DM     Time Frame (Bed Mobility Goal 1, PT) long term goal (LTG);short term goal (STG);3 days  -DM     Progress/Outcomes (Bed Mobility Goal 1, PT) goal partially met  -DM       Row Name 11/21/23 0850          Transfer Goal 1 (PT)    Activity/Assistive Device (Transfer Goal 1, PT) sit-to-stand/stand-to-sit;cane, straight  -DM     [place] Level/Cues Needed (Transfer Goal 1, PT) supervision required  -DM     Time Frame (Transfer Goal 1, PT) short term goal (STG);3 days  -DM     Progress/Outcome (Transfer Goal 1, PT) goal partially met  -DM       Row Name 11/21/23 0850          Gait Training Goal 1 (PT)    Activity/Assistive Device (Gait Training Goal 1, PT) gait (walking  locomotion);cane, straight  Stable VS; O2 sat > 92%  -DM     [place] Level (Gait Training Goal 1, PT) supervision required  -DM     Distance (Gait Training Goal 1, PT) 100  -DM     Time Frame (Gait Training Goal 1, PT) short term goal (STG);3 days  -DM     Progress/Outcome (Gait Training Goal 1, PT) goal partially met  -DM       Row Name 11/21/23 0850          Patient Education Goal (PT)    Activity (Patient Education Goal, PT) HEP exer  -DM     [place]/Cues/Accuracy (Memory Goal 2, PT) demonstrates adequately;verbalizes understanding  -DM     Time Frame (Patient Education Goal, PT) short term goal (STG);3 days  -DM     Progress/Outcome (Patient Education Goal, PT) goal met  -DM       Row Name 11/21/23 0850          Therapy Assessment/Plan (PT)    Planned Therapy Interventions (PT) bed mobility training;gait training;home exercise program;patient/family education;strengthening;transfer training  -DM               User Key  (r) = Recorded By, (t) = Taken By, (c) = Cosigned By      Initials Name Provider Type    DM Purvi Young, PT Physical Therapist                   Clinical Impression       Row Name 11/21/23 0850          Pain    Pain Intervention(s) Repositioned;Elevated;Rest  -DM     Additional Documentation Pain Scale: FACES Pre/Post-Treatment (Group)  -DM       Row Name 11/21/23 0850          Pain Scale: FACES Pre/Post-Treatment    Pain: FACES Scale, Pretreatment 2-->hurts little bit  -DM     Posttreatment Pain Rating 4-->hurts little more  -DM     Pain Location generalized  -DM     Pain Location - chest;neck;head  -DM     Pre/Posttreatment Pain Comment c/o gen.tense/soreness (chr. from stress), tho head/neck/chest  -DM       Row Name 11/21/23 0850          Plan of Care Review    Plan of Care Reviewed With patient  -DM     Progress improving  -DM     Outcome Evaluation PT eval completed. Presents w/ CAD/USA d/t STEMI, s/p LHC & stent, decr strength/endurance & impaired funct mobil below baseline.  STS w/ SBA to supv. x 4 trials (latter 2 w/ SPC), amb 40 ft x 2 w/ CGA (SPC on 2nd gt trial), toileting/hygiene in BR (nsg needing stool speci), & performed ther exer per issued HEP, but decl. UIC.Noted mod SOB/desat 91% on RA, HR to 95, elev BP, high anx. level, & mod fatigue. Pt states she may have to stay w/ son at d/c d/t persist. weakness, & is agreeable to HHPT f/u to return to baseline.  -DM       Row Name 11/21/23 0850          Therapy Assessment/Plan (PT)    Patient/Family Therapy Goals Statement (PT) improved funct mobil  -DM     Rehab Potential (PT) good, to achieve stated therapy goals  -DM     Criteria for Skilled Interventions Met (PT) yes;meets criteria;skilled treatment is necessary  -DM     Therapy Frequency (PT) daily  -DM       Row Name 11/21/23 0850          Vital Signs    Pre Systolic BP Rehab 127  -DM     Pre Treatment Diastolic BP 77  -DM     Post Systolic BP Rehab 138  -DM     Post Treatment Diastolic BP 86  -DM     Pretreatment Heart Rate (beats/min) 75  -DM     Intratreatment Heart Rate (beats/min) 95  -DM     Posttreatment Heart Rate (beats/min) 89  -DM     Pre SpO2 (%) 95  -DM     O2 Delivery Pre Treatment room air  -DM     Intra SpO2 (%) 91  -DM     O2 Delivery Intra Treatment room air  -DM     Post SpO2 (%) 94  -DM     O2 Delivery Post Treatment room air  -DM     Pre Patient Position Supine  -DM     Intra Patient Position Standing  -DM     Post Patient Position Supine  -DM     Rest Breaks  2  2 sitting  -DM       Row Name 11/21/23 0850          Positioning and Restraints    Pre-Treatment Position in bed  -DM     Post Treatment Position bed  -DM     In Bed notified nsg;supine;call light within reach;encouraged to call for assist;heels elevated  -DM               User Key  (r) = Recorded By, (t) = Taken By, (c) = Cosigned By      Initials Name Provider Type    Purvi Suárez, PT Physical Therapist                   Outcome Measures       Row Name 11/21/23 0850          How much help  from another person do you currently need...    Turning from your back to your side while in flat bed without using bedrails? 4  -DM     Moving from lying on back to sitting on the side of a flat bed without bedrails? 4  -DM     Moving to and from a bed to a chair (including a wheelchair)? 3  -DM     Standing up from a chair using your arms (e.g., wheelchair, bedside chair)? 3  -DM     Climbing 3-5 steps with a railing? 3  -DM     To walk in hospital room? 3  -DM     AM-PAC 6 Clicks Score (PT) 20  -DM     Highest Level of Mobility Goal 6 --> Walk 10 steps or more  -DM       Row Name 11/21/23 0850          Functional Assessment    Outcome Measure Options AM-PAC 6 Clicks Basic Mobility (PT)  -DM               User Key  (r) = Recorded By, (t) = Taken By, (c) = Cosigned By      Initials Name Provider Type    DM Purvi Young, PT Physical Therapist                                 Physical Therapy Education       Title: PT OT SLP Therapies (Done)       Topic: Physical Therapy (Done)       Point: Mobility training (Done)       Learning Progress Summary             Patient Eager, E,D,H, DU,VU by DM at 11/21/2023 1127                         Point: Home exercise program (Done)       Learning Progress Summary             Patient Eager, E,D,H, DU,VU by DM at 11/21/2023 1127                         Point: Body mechanics (Done)       Learning Progress Summary             Patient Eager, E,D,H, DU,VU by DM at 11/21/2023 1127                         Point: Precautions (Done)       Learning Progress Summary             Patient Myra, E,D,H, DU,VU by DM at 11/21/2023 1127                                         User Key       Initials Effective Dates Name Provider Type Discipline    DM 02/03/23 -  Purvi Young, PT Physical Therapist PT                  PT Recommendation and Plan  Planned Therapy Interventions (PT): bed mobility training, gait training, home exercise program, patient/family education, strengthening, transfer  training  Plan of Care Reviewed With: patient  Progress: improving  Outcome Evaluation: PT eval completed. Presents w/ CAD/[place] d/t STEMI, s/p LHC & stent, decr strength/endurance & impaired funct mobil below baseline. STS w/ SBA to supv. x 4 trials (latter 2 w/ SPC), amb 40 ft x 2 w/ CGA (SPC on 2nd gt trial), toileting/hygiene in BR (nsg needing stool speci), & performed ther exer per issued HEP, but decl. UIC.Noted mod SOB/desat 91% on RA, HR to 95, elev BP, high anx. level, & mod fatigue. Pt states she may have to stay w/ son at d/c d/t persist. weakness, & is agreeable to HHPT f/u to return to baseline.     Time Calculation:   PT Evaluation Complexity  History, PT Evaluation Complexity: 1-2 personal factors and/or comorbidities  Examination of Body Systems (PT Eval Complexity): 1-2 elements  Clinical Presentation (PT Evaluation Complexity): stable  Clinical Decision Making (PT Evaluation Complexity): low complexity  Overall Complexity (PT Evaluation Complexity): low complexity     PT Charges       Row Name 11/21/23 1128             Time Calculation    Start Time 0850  -DM      PT Received On 11/21/23  -DM      PT Goal Re-Cert Due Date 12/01/23  -DM         Timed Charges    04420 - PT Therapeutic Exercise Minutes 17  -DM      48612 - Gait Training Minutes  13  -DM      51941 - PT Therapeutic Activity Minutes 24  -DM         Untimed Charges    PT Eval/Re-eval Minutes 60  -DM         Total Minutes    Timed Charges Total Minutes 54  -DM      Untimed Charges Total Minutes 60  -DM       Total Minutes 114  -DM                User Key  (r) = Recorded By, (t) = Taken By, (c) = Cosigned By      Initials Name Provider Type    DM Purvi Young, PT Physical Therapist                  Therapy Charges for Today       Code Description Service Date Service Provider Modifiers Qty    40458274202 HC PT THER PROC EA 15 MIN 11/21/2023 Purvi Young, PT GP 1    21387473692 HC GAIT TRAINING EA 15 MIN 11/21/2023 Purvi Young,  PT GP 1    74303477759 HC PT THERAPEUTIC ACT EA 15 MIN 11/21/2023 Purvi Young, PT GP 2    94540041051 HC PT EVAL LOW COMPLEXITY 4 11/21/2023 Purvi Young, PT GP 1            PT G-Codes  Outcome Measure Options: AM-PAC 6 Clicks Basic Mobility (PT)  AM-PAC 6 Clicks Score (PT): 20  PT Discharge Summary  Anticipated Discharge Disposition (PT): home with assist, home with home health    Purvi Young, PT  11/21/2023

## 2023-11-21 NOTE — DISCHARGE SUMMARY
Date of Discharge:  11/21/2023    Discharge Diagnosis: Inferior STEMI    Presenting Problem/History of Present Illness  CAD (coronary artery disease), native coronary artery [I25.10]    Inferior STEMI, anxiety, hypertension, hyperlipidemia, tobacco abuse    Hospital Course  Patient is a 63 y.o. female presented with palpitations and pounding heartbeat, EKG at [facility] ER showed inferior ST elevation therefore she presented for emergent cardiac catheterization at [facility] on 11/19/2023.  She was found to have  of a codominant RCA and proximal to mid codominant left circumflex 80% tandem lesions status post PCI with a 4.0 mm BERENICE.  LAD relatively normal.  EF by echo 40 to 45% with basal inferior akinesis.  She was counseled importance of tobacco cessation and she was prescribed nicotine patches and Nicorette gum.  She has a lot of anxiety and hospital medicine were referred her to psychiatry as an outpatient.  I discussed in detail the importance of medication compliance especially aspirin and Plavix as well as smoking cessation exercise and cardiac rehab.  She initially had diarrhea and mild leukocytosis but white count normalized and diarrhea improved during hospitalization.  Hospitalist was consulted for management.  She will follow-up with me in 2-3 months.    Procedures Performed  Procedure(s):  Left Heart Cath  Stent BERENICE coronary       Results for orders placed during the hospital encounter of 11/19/23    Cardiac Catheterization/Vascular Study    Narrative    False STEMI, 100%  of the medium caliber codominant RCA with mature left-to-right collaterals    Codominant large circumflex tandem 80% stenoses of the proximal and mid status post PCI with a 4.0 x 23 mm Xience BERENICE.    Scattered mid to distal LAD 10-20%.  Normal left main.    LV pressures (S/D/E) : 138/-4/10 mmHg    The ejection fraction was greater than 55% by visual estimate.  With akinesis of the basal inferior segment.    Mild  "aortic stenosis may be present with a peak to peak gradient of 35 mmHg, we will check an echocardiogram    Aspirin 81 mg for life, clopidogrel 75 mg daily for no less than 6 months       Consults:   Consults       Date and Time Order Name Status Description    11/19/2023 10:57 AM Inpatient Hospitalist Consult Completed             Pertinent Test Results:   Results for orders placed during the hospital encounter of 11/19/23    Adult Transthoracic Echo Complete W/ Cont if Necessary Per Protocol    Interpretation Summary    Left ventricular systolic function is moderately decreased. Calculated left ventricular EF = 37.9% Left ventricular ejection fraction appears to be 41 - 45%.    The following left ventricular wall segments are hypokinetic: basal anterolateral, mid anterolateral, apical lateral and mid inferolateral. The following left ventricular wall segments are akinetic: basal inferolateral.    Left ventricular diastolic function is consistent with (grade Ia w/high LAP) impaired relaxation.    Estimated right ventricular systolic pressure from tricuspid regurgitation is normal (<35 mmHg).    No significant structural or functional valvular disease.        Condition on Discharge: Stable    Physical Exam at Discharge    Vital Signs  /77 (BP Location: Right arm, Patient Position: Lying)   Pulse 75   Temp 98.8 °F (37.1 °C) (Oral)   Resp 15   Ht 167 cm (65.75\")   Wt 86 kg (189 lb 9.5 oz)   SpO2 91%   BMI 30.84 kg/m²       Physical Exam:  General Appearance:   · well developed  · well nourished  HENT:   · oropharynx moist  · lips not cyanotic  Neck:  · thyroid not enlarged  · supple  Respiratory:  · no respiratory distress  · normal breath sounds  · no rales  Cardiovascular:  · no jugular venous distention  · regular rhythm  · apical impulse normal  · S1 normal, S2 normal  · no S3, no S4   · no murmur  · no rub, no thrill  · carotid pulses normal; no bruit  · pedal pulses normal  · lower extremity edema: " none    Gastrointestinal:   · bowel sounds normal  · non-tender  · no hepatomegaly, no splenomegaly  Musculoskeletal:  · no clubbing of fingers.   · normocephalic, head atraumatic  Skin:   · warm  · dry  Psychiatric:  · judgement and insight appropriate  · normal mood and affect      Discharge Disposition  Home or Self Care    Discharge Medications     Discharge Medications        New Medications        Instructions Start Date   aspirin 81 MG EC tablet   81 mg, Oral, Daily   Start Date: November 22, 2023     clopidogrel 75 MG tablet  Commonly known as: PLAVIX   75 mg, Oral, Daily   Start Date: November 22, 2023     hydrOXYzine 25 MG tablet  Commonly known as: ATARAX   25 mg, Oral, 3 Times Daily PRN      nicotine 21 MG/24HR patch  Commonly known as: NICODERM CQ   1 patch, Transdermal, Daily   Start Date: November 22, 2023     nicotine polacrilex 4 MG gum  Commonly known as: NICORETTE   4 mg, Mouth/Throat, Every 1 Hour PRN      nitroglycerin 0.4 MG SL tablet  Commonly known as: NITROSTAT   0.4 mg, Sublingual, Every 5 Minutes PRN, Take no more than 3 doses in 15 minutes.      pantoprazole 40 MG EC tablet  Commonly known as: PROTONIX   40 mg, Oral, Every Early Morning   Start Date: November 22, 2023     rosuvastatin 20 MG tablet  Commonly known as: CRESTOR   20 mg, Oral, Nightly             Changes to Medications        Instructions Start Date   metoprolol succinate XL 50 MG 24 hr tablet  Commonly known as: TOPROL-XL  What changed: medication strength   25 mg, Oral, Daily             Continue These Medications        Instructions Start Date   albuterol sulfate  (90 Base) MCG/ACT inhaler  Commonly known as: PROVENTIL HFA;VENTOLIN HFA;PROAIR HFA   2 puffs, Inhalation, Every 4 Hours PRN      busPIRone 5 MG tablet  Commonly known as: BUSPAR   5 mg, Oral, 2 Times Daily      citalopram 20 MG tablet  Commonly known as: CeleXA   20 mg, Oral, Daily      Fluticasone-Salmeterol 100-50 MCG/ACT DISKUS  Commonly known as:  ADVAIR/WIXELA   1 puff, Inhalation, 2 Times Daily PRN      lisinopril 20 MG tablet  Commonly known as: PRINIVIL,ZESTRIL   20 mg, Oral, Daily               Discharge Diet: Cardiac    Activity at Discharge: As tolerated    Follow-up Appointments  No future appointments.  Additional Instructions for the Follow-ups that You Need to Schedule       Discharge Follow-up with Specialty: Chaitanya Linn MD, [facility] [phone]; 2 Months   As directed      Specialty: Chaitanya Linn MD, [facility] [phone]   Follow Up: 2 Months   Follow Up Details: Hospital FU for pci                Test Results Pending at Discharge  Pending Labs       Order Current Status    Clostridioides difficile Toxin - Stool, Per Rectum In process    Clostridioides difficile Toxin, PCR - Stool, Per Rectum In process    Gastrointestinal Panel, PCR - Stool, Per Rectum In process              Time: I spent  42  minutes on this discharge activity which included: face-to-face encounter with the patient, reviewing the data in the system, coordination of the care with the nursing staff as well as consultants, documentation, and entering orders.       Chaitanya Linn MD  11/21/23  11:20 EST

## 2023-11-21 NOTE — PROGRESS NOTES
[facility]  PROGRESS NOTE    Patient Name: Fatemeh Howard  : 1960  MRN: [id number]    Date of Admission: 2023  Primary Care Physician: Provider, No Known    Subjective   Subjective     CC:  F/u diarrhea    HPI:  No new overnight events      Objective   Objective     Vital Signs:   Temp:  [97.7 °F (36.5 °C)-98.8 °F (37.1 °C)] 98.8 °F (37.1 °C)  Heart Rate:  [] 72  Resp:  [15-20] 15  BP: (127-157)/() 129/79     Physical Exam:  Constitutional: No acute distress, awake, alert  HENT: NCAT, mucous membranes moist  Respiratory: Clear to auscultation bilaterally, respiratory effort normal   Cardiovascular: RRR, no murmurs, rubs, or gallops  Gastrointestinal: Positive bowel sounds, soft, nontender, nondistended  Musculoskeletal: No bilateral ankle edema  Psychiatric: anxious affect, cooperative  Neurologic: Oriented x 3, no focal deficits  Skin: No rashes      Results Reviewed:  LAB RESULTS:      Lab 23  1217 23  1002   WBC  --  13.56*   HEMOGLOBIN  --  14.7   HEMATOCRIT  --  44.8   PLATELETS  --  262   NEUTROS ABS  --  10.69*   IMMATURE GRANS (ABS)  --  0.04   LYMPHS ABS  --  2.33   MONOS ABS  --  0.40   EOS ABS  --  0.05   MCV  --  90.0   PROTIME 14.8*  --    APTT 164.8*  --          Lab 23  0427 23  0521 23  2336 23  1217 23  1002   SODIUM 140 132*  --  139 139   POTASSIUM 3.6 4.3 4.3 3.3* 2.7*   CHLORIDE 105 100  --  100 101   CO2 27.0 25.0  --  27.0 23.0   ANION GAP 8.0 7.0  --  12.0 15.0   BUN 12 7*  --  10 11   CREATININE 0.63 0.46*  --  0.54* 0.59   EGFR 99.8 107.7  --  103.6 101.4   GLUCOSE 121* 135*  --  168* 239*   CALCIUM 8.8 9.3  --  9.1 9.0   MAGNESIUM  --   --   --   --  1.7   HEMOGLOBIN A1C  --  5.60  --   --   --          Lab 23  1002   TOTAL PROTEIN 6.8   ALBUMIN 3.8   GLOBULIN 3.0   ALT (SGPT) 9   AST (SGOT) 12   BILIRUBIN 0.6   ALK PHOS 103         Lab 23  0521 23  1218  11/19/23  1002   HSTROP T 1,988* 9 6   PROTIME  --  14.8*  --    INR  --  1.14*  --          Lab 11/19/23  1002   CHOLESTEROL 195   LDL CHOL 140*   HDL CHOL 29*   TRIGLYCERIDES 143             Brief Urine Lab Results  (Last result in the past 365 days)        Color   Clarity   Blood   Leuk Est   Nitrite   Protein   CREAT   Urine HCG        11/20/23 1337 Yellow   Clear   Small (1+)   Negative   Negative   Negative                   Microbiology Results Abnormal       Procedure Component Value - Date/Time    Gastrointestinal Panel, PCR - Stool, Per Rectum [[id number]]  (Normal) Collected: 11/21/23 1022    Lab Status: Final result Specimen: Stool from Per Rectum Updated: 11/21/23 1157     Campylobacter Not Detected     Plesiomonas shigelloides Not Detected     Salmonella Not Detected     Vibrio Not Detected     Vibrio cholerae Not Detected     Yersinia enterocolitica Not Detected     Enteroaggregative E. coli (EAEC) Not Detected     Enteropathogenic E. coli (EPEC) Not Detected     Enterotoxigenic E. coli (ETEC) lt/st Not Detected     Shiga-like toxin-producing E. coli (STEC) stx1/stx2 Not Detected     Shigella/Enteroinvasive E. coli (EIEC) Not Detected     Cryptosporidium Not Detected     Cyclospora cayetanensis Not Detected     Entamoeba histolytica Not Detected     Giardia lamblia Not Detected     Adenovirus F40/41 Not Detected     Astrovirus Not Detected     Norovirus GI/GII Not Detected     Rotavirus A Not Detected     Sapovirus (I, II, IV or V) Not Detected    Respiratory Panel PCR w/COVID-19(SARS-CoV-2) ENRIQUETA/MARIA DEL ROSARIO/MATTY/PAD/COR/[facility]ITAL In-House, NP Swab in UTM/VTM, 2 HR TAT - Swab, Nasopharynx [[id number]]  (Normal) Collected: 11/19/23 1605    Lab Status: Final result Specimen: Swab from Nasopharynx Updated: 11/19/23 1658     ADENOVIRUS, PCR Not Detected     Coronavirus 229E Not Detected     Coronavirus HKU1 Not Detected     Coronavirus NL63 Not Detected     Coronavirus OC43 Not Detected     COVID19 Not Detected     Human  Metapneumovirus Not Detected     Human Rhinovirus/Enterovirus Not Detected     Influenza A PCR Not Detected     Influenza B PCR Not Detected     Parainfluenza Virus 1 Not Detected     Parainfluenza Virus 2 Not Detected     Parainfluenza Virus 3 Not Detected     Parainfluenza Virus 4 Not Detected     RSV, PCR Not Detected     Bordetella pertussis pcr Not Detected     Bordetella parapertussis PCR Not Detected     Chlamydophila pneumoniae PCR Not Detected     Mycoplasma pneumo by PCR Not Detected    Narrative:      In the setting of a positive respiratory panel with a viral infection PLUS a negative procalcitonin without other underlying concern for bacterial infection, consider observing off antibiotics or discontinuation of antibiotics and continue supportive care. If the respiratory panel is positive for atypical bacterial infection (Bordetella pertussis, Chlamydophila pneumoniae, or Mycoplasma pneumoniae), consider antibiotic de-escalation to target atypical bacterial infection.            Adult Transthoracic Echo Complete W/ Cont if Necessary Per Protocol    Result Date: 11/20/2023    Left ventricular systolic function is moderately decreased. Calculated left ventricular EF = 37.9% Left ventricular ejection fraction appears to be 41 - 45%.   The following left ventricular wall segments are hypokinetic: basal anterolateral, mid anterolateral, apical lateral and mid inferolateral. The following left ventricular wall segments are akinetic: basal inferolateral.   Left ventricular diastolic function is consistent with (grade Ia w/high LAP) impaired relaxation.   Estimated right ventricular systolic pressure from tricuspid regurgitation is normal (<35 mmHg).   No significant structural or functional valvular disease.     XR Chest 1 View    Result Date: 11/19/2023  XR CHEST 1 VW Date of Exam: 11/19/2023 4:41 PM EST Indication: hypoxia Comparison: None available. Findings: Heart size and pulmonary vessels are within  normal limits. Lungs are clear bilaterally. There are no pleural effusions. Bony structures are unremarkable.     Impression: Impression: 1. No acute cardiopulmonary disease. Electronically Signed: Alberto Camarena MD  11/19/2023 5:06 PM EST  Workstation ID: [id number]     Results for orders placed during the hospital encounter of 11/19/23    Adult Transthoracic Echo Complete W/ Cont if Necessary Per Protocol    Interpretation Summary    Left ventricular systolic function is moderately decreased. Calculated left ventricular EF = 37.9% Left ventricular ejection fraction appears to be 41 - 45%.    The following left ventricular wall segments are hypokinetic: basal anterolateral, mid anterolateral, apical lateral and mid inferolateral. The following left ventricular wall segments are akinetic: basal inferolateral.    Left ventricular diastolic function is consistent with (grade Ia w/high LAP) impaired relaxation.    Estimated right ventricular systolic pressure from tricuspid regurgitation is normal (<35 mmHg).    No significant structural or functional valvular disease.      Current medications:  Scheduled Meds:aspirin, 81 mg, Oral, Daily  clopidogrel, 75 mg, Oral, Daily  fluticasone, 2 spray, Each Nare, Daily  insulin lispro, 2-7 Units, Subcutaneous, 4x Daily AC & at Bedtime  losartan, 25 mg, Oral, Daily  metoprolol tartrate, 25 mg, Oral, Q12H  nicotine, 1 patch, Transdermal, Daily  pantoprazole, 40 mg, Oral, Q AM  pharmacy consult - [place], , Does not apply, Daily  potassium chloride, 40 mEq, Oral, Q4H  rosuvastatin, 20 mg, Oral, Nightly      Continuous Infusions:     PRN Meds:.  acetaminophen    ALPRAZolam    aluminum-magnesium hydroxide-simethicone    Calcium Replacement - Follow Nurse / BPA Driven Protocol    dextrose    dextrose    glucagon (human recombinant)    hydrALAZINE    HYDROcodone-acetaminophen    hydrOXYzine    ipratropium-albuterol    Magnesium Standard Dose Replacement - Follow Nurse / BPA Driven Protocol     nicotine polacrilex    nitroglycerin    ondansetron **OR** ondansetron    Phosphorus Replacement - Follow Nurse / BPA Driven Protocol    Potassium Replacement - Follow Nurse / BPA Driven Protocol    Assessment & Plan   Assessment & Plan     Active Hospital Problems    Diagnosis  POA    **Coronary artery disease of native artery of native heart with stable angina pectoris [I25.118]  Unknown    Anxiety [F41.9]  Unknown    Acute diarrhea [R19.7]  Unknown    Primary hypertension [I10]  Unknown    Tobacco abuse [Z72.0]  Unknown    Mixed hyperlipidemia [E78.2]  Unknown      Resolved Hospital Problems   No resolved problems to display.        Brief Hospital Course to date:  Fatemeh Howard is a 63 y.o. female with past medical history significant for HTN, HLD, anxiety, and tobacco abuse who was transferred from [facility] on 11/19/2023 due to concern for STEMI. Hospital medicine was consulted to assist with medical management.      CAD  -s/p LHC with BERENICE to circumflex  -Management per primary     Acute hypoxic respiratory failure --resolved  -SpO2 86% on RA, does not use O2 at home  -Respiratory PCR neg  -CXR shows no acute disease  -wean O2 as tolerated, encouraged IS/flutter valve  -IS, Pulm toilet  -DuoNebs PRN      Leukocytosis  Diarrhea  Hx IBS   -WBC 13.56 on presentation  -GI PCR, Cdiff neg  -UA unremarkable  -s/p fluids.   -Afebrile  --suspect her diarrhea is secondary to underlying IBS/anxiety issues. Infectious w/u neg. Ultimately needs better control of underlying anxiety, recommend outpatient psych referral      Hypokalemia  -Replace per protocol      Hyperglycemia   - on presentation  -A1c is 5.6     Anxiety  -Xanax, Atarax PRN   -May benefit from  referral at d/c     Expected Discharge Location and Transportation: home  Expected Discharge   Expected Discharge Date: 11/21/2023; Expected Discharge Time:      DVT prophylaxis:  No DVT prophylaxis order currently exists.     AM-PAC 6 Clicks Score  (PT): 20 (11/21/23 0850)    CODE STATUS:   Code Status and Medical Interventions:   Ordered at: 11/19/23 1107     Code Status (Patient has no pulse and is not breathing):    CPR (Attempt to Resuscitate)     Medical Interventions (Patient has pulse or is breathing):    Full Support       Leila Lucero MD  11/21/23

## 2023-11-21 NOTE — CASE MANAGEMENT/SOCIAL WORK
Continued Stay Note  [facility]     Patient Name: Fatemeh Howard  MRN: [id number]  Today's Date: 11/21/2023    Admit Date: 11/19/2023    Plan: discharge plan   Discharge Plan       Row Name 11/21/23 1417       Plan    Final Discharge Disposition Code 01 - home or self-care      Row Name 11/21/23 1415       Plan    Plan discharge plan    Final Discharge Disposition Code 01 - home or self-care    Final Note I spoke with pt and and plan remains home and son will transport. Pt reports PT gave her a cane to use. SW currently working on psych appt for after d/c. Pt reports son will transport her home. I asked pt about HH per PT recommendations and pt does not want to decide on that now. Pt wants her cardiology appt in [place] if possible but aware that Dr Linn does not go to [place]. I encouraged pt to keep appt with Dr Linn in [place] and then see if he recommends someone for her to see in [place]. Pt denies further discharge needs.                    Discharge Codes    No documentation.                 Expected Discharge Date and Time       Expected Discharge Date Expected Discharge Time    Nov 21, 2023               Hanna Blanco RN

## 2023-11-21 NOTE — PLAN OF CARE
Goal Outcome Evaluation:  Plan of Care Reviewed With: patient        Progress: improving  Outcome Evaluation: PT eval completed. Presents w/ CAD/USA d/t STEMI, s/p LHC & stent, decr strength/endurance & impaired funct mobil below baseline. STS w/ SBA to supv. x 4 trials (latter 2 w/ SPC), amb 40 ft x 2 w/ CGA (SPC on 2nd gt trial), toileting/hygiene in BR (nsg needing stool speci), & performed ther exer per issued HEP, but decl. UIC.Noted mod SOB/desat 91% on RA, HR to 95, elev BP, high anx. level, & mod fatigue. Pt states she may have to stay w/ son at d/c d/t persist. weakness, & is agreeable to HHPT f/u to return to baseline.      Anticipated Discharge Disposition (PT): home with assist, home with home health

## 2023-11-22 ENCOUNTER — DOCUMENTATION (OUTPATIENT)
Dept: CARDIAC REHAB | Facility: HOSPITAL | Age: 63
End: 2023-11-22
Payer: COMMERCIAL

## 2023-11-22 RX ORDER — CLOPIDOGREL BISULFATE 75 MG/1
75 TABLET ORAL DAILY
Qty: 90 TABLET | Refills: 3 | Status: SHIPPED | OUTPATIENT
Start: 2023-11-22

## 2023-11-22 RX ORDER — LISINOPRIL 20 MG/1
20 TABLET ORAL DAILY
Qty: 90 TABLET | Refills: 3 | Status: SHIPPED | OUTPATIENT
Start: 2023-11-22

## 2023-11-22 RX ORDER — NITROGLYCERIN 0.4 MG/1
0.4 TABLET SUBLINGUAL
Qty: 25 TABLET | Refills: 11 | Status: SHIPPED | OUTPATIENT
Start: 2023-11-22

## 2023-11-22 RX ORDER — METOPROLOL SUCCINATE 25 MG/1
25 TABLET, EXTENDED RELEASE ORAL DAILY
Qty: 90 TABLET | Refills: 3 | Status: SHIPPED | OUTPATIENT
Start: 2023-11-22

## 2023-11-22 RX ORDER — ASPIRIN 81 MG/1
81 TABLET ORAL DAILY
Qty: 90 TABLET | Refills: 3 | Status: SHIPPED | OUTPATIENT
Start: 2023-11-22

## 2023-11-22 RX ORDER — NICOTINE 21 MG/24HR
1 PATCH, TRANSDERMAL 24 HOURS TRANSDERMAL DAILY
Qty: 90 EACH | Refills: 3 | Status: SHIPPED | OUTPATIENT
Start: 2023-11-22

## 2023-11-22 RX ORDER — HYDROXYZINE HYDROCHLORIDE 25 MG/1
25 TABLET, FILM COATED ORAL 3 TIMES DAILY PRN
Qty: 90 TABLET | Refills: 3 | Status: SHIPPED | OUTPATIENT
Start: 2023-11-22

## 2023-11-22 RX ORDER — PANTOPRAZOLE SODIUM 40 MG/1
40 TABLET, DELAYED RELEASE ORAL
Qty: 20 TABLET | Refills: 0 | Status: SHIPPED | OUTPATIENT
Start: 2023-11-22

## 2023-11-22 RX ORDER — ROSUVASTATIN CALCIUM 20 MG/1
20 TABLET, COATED ORAL NIGHTLY
Qty: 90 TABLET | Refills: 3 | Status: SHIPPED | OUTPATIENT
Start: 2023-11-22

## 2023-11-22 NOTE — TELEPHONE ENCOUNTER
Pt called with questions about new medications she'd like for me to send in prescriptions to her local pharmacy. She requests metoprolol succ tablets be sent for the 25 mg so she doesn't have to split the 50 mg. She has baseline severe anxiety and on some medications for anxiety. She mentioned she can't even check her BP because of anxiety it is always high. We discussed, in detail, to reach out to the prescriber who manages anxiety to get an appt to update them about recent heart stent. She requests to switch to a provider that goes to [place] because anxiety is so bad she cannot drive to [place]. Due to this, she also wants to cancel her H&V appt. Reached out to H&V to cancel appt. Message sent to scheduling to switch RDS to either J or . Discussed anxiety is likely going to get a little worse and this is very common especially after a heart event. She has an appt with [facility] on 11/28. She has anxiety that causes muscle tension and she takes ibuprofen for that. Advised this cannot be used long term, only sparingly, and she should take Tylenol. She has anxiety about nose bleeds, we discuss interventions to prevent/improve nose bleeds such as a humidifier, Vaseline, and saline spray. She expressed being nervous about who to contact until she sees the new cardiologist, she will reach out to me until then. Pt states hydroxyzine was sent in at the hospital PRN TID for anxiety. Advised I can send in the refills for this but they are only temporary and next refill will have to be deferred to provider managing anxiety. I told her I am not always in the office, in clinic at outside locations, and not at my desk when in clinic in [place]. I told her to leave a  and I will always call her back. I told her Subtextual messages may benefit her more simply because of her anxiety if she has a question she can write on Subtextual before she forgets the question. She did agree that may be better but expressed  anxiety about having to set MyChart up. She says she doesn't check her emails often. She is agreeable to try to set up. Msg sent to  to email link and code. Email updated in her chart. We went over several things about symptoms of anxiety such as chest pain, SOB, palpitations, and how she may call with chest pain, but it's likely more r/t anxiety and reiterated the importance of compliance with therapist, encouraged both medications and therapy. She was very receptive to talking about her anxiety with me, and agreed this is a very challenging issue for her. Pt verbalized understanding and agreeable to plan, no further questions at this time.

## 2023-11-22 NOTE — PROGRESS NOTES
"Enter Query Response Below      Query Response: Type 1 MI due to plaque rupture.           If applicable, please update the problem list.   Patient: Fatemeh Howard        : 1960  Account: [id number]           Admit Date:         How to Respond to this query:       a. Click New Note     b. Answer query within the yellow box.                c. Update the Problem List, if applicable.      If you have any questions about this query contact me at: [email or web address]     :    Patient transferred from OSH on  for possible STEMI.  HS Troponin T 6, 9, 1,988, Troponin T Delta 3.   EKG \"Sinus rhythm with premature atrial complexes, Left anterior fascicular block, Cannot rule out Inferior infarct , age undetermined, Anterior infarct , age undetermined, Abnormal ECG, No previous ECGs available\"   Per Cath report \"False STEMI\" 100% patient found to have  of the medium caliber codominant RCA with mature left-to-right collaterals, Codominant large circumflex tandem 80% stenoses of the proximal and mid and scattered mid to distal LAD 10-20%.  Normal left main.  Patient s/p stent x 1 to circumflex.   Per DC summary \"Inferior STEMI\" is documented as the discharge diagnosis.    After study, can the patient's condition be further specified as:  - Type 1 MI due to ______(please specify- plaque erosion, rupture, fissure or thrombus)  - Type 2 MI due to fixed CAD.  - Abnormal laboratory values of no significance.  - Other_______________.  - Unable to determine.    By submitting this query, we are merely seeking further clarification of documentation to accurately reflect all conditions that you are monitoring, evaluating, treating or that extend the hospitalization or utilize additional resources of care. Please utilize your independent clinical judgment when addressing the question(s) above.     This query and your response, once completed, will be entered into the legal medical " record.    Sincerely,  Anastasiia Tobin RN  Clinical Documentation Integrity Program

## 2023-11-22 NOTE — PROGRESS NOTES
Cardiac Rehab staff mailed referral letter to patient regarding Phase II Cardiac Rehab program. Instruction for patient to contact [facility] Cardiac Rehab Department for additional program information and to forward referral to closest Cardiac Rehab program.

## 2023-12-01 NOTE — PROGRESS NOTES
"Enter Query Response Below      Query Response: acute resp failure was not supported             If applicable, please update the problem list.   Patient: Fatemeh Howard        : 1960  Account: [id number]           Admit Date:         How to Respond to this query:       a. Click New Note     b. Answer query within the yellow box.                c. Update the Problem List, if applicable.      If you have any questions about this query contact me at: [email or web address]     :     Patient with an O2 sat of 92% and a RR of 18 on admission. She has been maintained on RA - 4L throughout this stay with oxygen sats ranging from 91-95% and RR's of 15-20. No ABG has been done. There has been no accessory muscle use, labored breathing, tachypnea or acute distress noted. \"Acute hypoxic respiratory failure  is documented on the H&P and in the progress note on .    After study, was acute respiratory failure clinically supported during this admission?    Acute respiratory failure was supported with additional clinical indicators:____________  Acute respiratory failure was not supported  Other- specify_____________  Unable to determine      By submitting this query, we are merely seeking further clarification of documentation to accurately reflect all conditions that you are monitoring, evaluating, treating or that extend the hospitalization or utilize additional resources of care. Please utilize your independent clinical judgment when addressing the question(s) above.     This query and your response, once completed, will be entered into the legal medical record.    Sincerely,  Anastasiia Tobin RN  Clinical Documentation Integrity Program     "

## 2024-01-29 NOTE — PROGRESS NOTES
Subjective:     Encounter Date:01/31/2024    Primary Care Physician: Silvana Murphy MD      Patient ID: Fatemeh Howard is a 63 y.o. female.    Chief Complaint:Coronary Artery Disease    PROBLEM LIST:  Coronary artery disease  11/2023 false STEMI 100%  medium caliber codominant RCA with mature left-to-right collaterals.80% codominant large circumflex (4 x 23 Xience BERENICE).  EF 55%.  Aortic stenosis with peak to peak gradient of 35 mmHg  Echocardiogram LVEF 45%, no significant AS.  (1/17/2024)  Dyslipidemia  Hypertension  Anxiety/depression  GERD  Primary adenocarcinoma of nasal cavity  Tobacco use, resolved 12/23  Surgeries:  Previous oral surgery (2000's) for oral cancer (adenocarcinoma)    No past medical history on file.  Past Surgical History:   Procedure Laterality Date    CARDIAC CATHETERIZATION N/A 11/19/2023    Procedure: Left Heart Cath;  Surgeon: Chaitanya Linn MD;  Location:  [facility];  Service: Cardiovascular;  Laterality: N/A;    CARDIAC CATHETERIZATION N/A 11/19/2023    Procedure: Stent BERENICE coronary;  Surgeon: Chaitanya Linn MD;  Location:  [facility];  Service: Cardiovascular;  Laterality: N/A;       No Known Allergies      Current Outpatient Medications:     albuterol sulfate  (90 Base) MCG/ACT inhaler, Inhale 2 puffs Every 4 (Four) Hours As Needed for Wheezing., Disp: , Rfl:     ALPRAZolam (XANAX) 0.5 MG tablet, TAKE 1 TABLET ORAL ROUTE EVERY 8 HOURS AS NEEDED, Disp: , Rfl:     clopidogrel (PLAVIX) 75 MG tablet, Take 1 tablet by mouth Daily., Disp: 90 tablet, Rfl: 3    Fluticasone-Salmeterol (ADVAIR/WIXELA) 100-50 MCG/ACT DISKUS, Inhale 1 puff 2 (Two) Times a Day As Needed., Disp: , Rfl:     hydrOXYzine (ATARAX) 25 MG tablet, Take 1 tablet by mouth 3 (Three) Times a Day As Needed for Anxiety. FURTHER REFILLS FROM PCP, Disp: 90 tablet, Rfl: 3    lisinopril (PRINIVIL,ZESTRIL) 20 MG tablet, Take 1 tablet by mouth Daily., Disp: 90 tablet, Rfl: 3     "metoprolol succinate XL (TOPROL-XL) 25 MG 24 hr tablet, Take 1 tablet by mouth Daily., Disp: 90 tablet, Rfl: 3    nitroglycerin (NITROSTAT) 0.4 MG SL tablet, Place 1 tablet under the tongue Every 5 (Five) Minutes As Needed for Chest Pain (Systolic BP Greater Than 100). Take no more than 3 doses in 15 minutes., Disp: 25 tablet, Rfl: 11    aspirin 81 MG EC tablet, Take 1 tablet by mouth Daily. (Patient not taking: Reported on 1/31/2024), Disp: 90 tablet, Rfl: 3    rosuvastatin (CRESTOR) 20 MG tablet, Take 1 tablet by mouth Every Night. (Patient not taking: Reported on 1/31/2024), Disp: 90 tablet, Rfl: 3        History of Present Illness    Patient returns today for hospital follow-up status post left circumflex stenting for \"field STEMI\" that ended up not being a myocardial infarction.  She has known chronically occluded RCA as well.  She has no chest pain shortness of breath.  She has had her typical \"anxiety\" spells.  Since being discharged she has been diagnosed with a maxillary adenocarcinoma grade 1 which is felt to be a slow growth from her previously removed oral cancer.  She had significant hematemesis with this, was admitted to [facility] and subsequently [facility] requiring multiple packings before surgical cauterization and biopsy confirming the presence of the mass.  She has been returned back on her Plavix and has not had any nosebleeds over the last week on this.  She is scheduled to undergo full resection of her adenocarcinoma at a future date, with PET to rule out metastatic disease pending.  She has stopped smoking however at the time of her MI 2 months ago.  :-).    The following portions of the patient's history were reviewed and updated as appropriate: allergies, current medications, past family history, past medical history, past social history, past surgical history and problem list.      Social History     Tobacco Use    Smoking status: Every Day     Packs/day: 2     Types: " "Cigarettes    Smokeless tobacco: Current   Substance Use Topics    Alcohol use: Not Currently         ROS       Objective:   /87   Pulse 91   Ht 167.6 cm (66\")   Wt 81.5 kg (179 lb 9.6 oz)   SpO2 97%   BMI 28.99 kg/m²         Vitals reviewed.   Constitutional:       Appearance: Well-developed and not in distress.   Neck:      Thyroid: No thyromegaly.      Vascular: No carotid bruit or JVD.   Pulmonary:      Breath sounds: Normal breath sounds.   Cardiovascular:      Regular rhythm.      No gallop. No S3 and S4 gallop.   Pulses:     Intact distal pulses.      Carotid: 2+ bilaterally.     Radial: 2+ bilaterally.  Edema:     Peripheral edema absent.   Abdominal:      General: Bowel sounds are normal.      Palpations: Abdomen is soft. There is no abdominal mass.      Tenderness: There is no abdominal tenderness.   Musculoskeletal:         General: No deformity.      Extremities: No clubbing present.Skin:     General: Skin is warm and dry.      Findings: No rash.   Neurological:      Mental Status: Alert and oriented to person, place, and time.         Procedures          Assessment:   Assessment & Plan      Diagnoses and all orders for this visit:    1. Coronary artery disease involving native coronary artery of native heart without angina pectoris (Primary)      1.  Coronary artery disease, 2-month status post left circumflex stent.  Known chronically occluded RCA with excellent collaterals.  2.  Tobacco use, now resolved  3.  Dyslipidemia patient not taking statin.  Baseline   4.  Maxillary adenocarcinoma grade 1.  Likely needs surgical resection in the upcoming near future.  5.  Hypertension well-controlled on current medical therapy  6.  Anxiety disorder    Recommendations:  1.  Patient is low cardiovascular risk of upcoming ENT surgery may proceed without further interim testing.  2.  From a cardiac standpoint, ideally patient would continue 3 months of Plavix (ending 12/19/2024).  At which time " the Plavix may be permanently discontinued.  3.  Discussed with patient if she has any bleeding prior to this time, that she is at low risk of stent thrombosis, and if she has any further epistaxis/bleeding, she may discontinue Plavix prematurely.  4.  She can resume low-dose 81 mg aspirin, postoperatively.  5.  Continue to encourage smoking cessation  6.  She is to resume her rosuvastatin.  7.  Revisit in 6 months or as needed symptom change     Jeronimo Ray MD            Dictated utilizing Dragon dictation

## 2024-01-31 ENCOUNTER — OFFICE VISIT (OUTPATIENT)
Dept: CARDIOLOGY | Facility: CLINIC | Age: 64
End: 2024-01-31
Payer: COMMERCIAL

## 2024-01-31 VITALS
HEIGHT: 66 IN | OXYGEN SATURATION: 97 % | BODY MASS INDEX: 28.87 KG/M2 | SYSTOLIC BLOOD PRESSURE: 139 MMHG | DIASTOLIC BLOOD PRESSURE: 87 MMHG | WEIGHT: 179.6 LBS | HEART RATE: 91 BPM

## 2024-01-31 DIAGNOSIS — I25.10 CORONARY ARTERY DISEASE INVOLVING NATIVE CORONARY ARTERY OF NATIVE HEART WITHOUT ANGINA PECTORIS: Primary | ICD-10-CM

## 2024-01-31 PROCEDURE — 99214 OFFICE O/P EST MOD 30 MIN: CPT | Performed by: INTERNAL MEDICINE

## 2024-01-31 PROCEDURE — 3075F SYST BP GE 130 - 139MM HG: CPT | Performed by: INTERNAL MEDICINE

## 2024-01-31 PROCEDURE — 3079F DIAST BP 80-89 MM HG: CPT | Performed by: INTERNAL MEDICINE

## 2024-01-31 PROCEDURE — 1160F RVW MEDS BY RX/DR IN RCRD: CPT | Performed by: INTERNAL MEDICINE

## 2024-01-31 PROCEDURE — 1159F MED LIST DOCD IN RCRD: CPT | Performed by: INTERNAL MEDICINE

## 2024-01-31 RX ORDER — ALPRAZOLAM 0.5 MG/1
TABLET ORAL
COMMUNITY
Start: 2024-01-24

## 2024-07-16 ENCOUNTER — TELEPHONE (OUTPATIENT)
Dept: CARDIOLOGY | Facility: CLINIC | Age: 64
End: 2024-07-16
Payer: COMMERCIAL

## 2024-07-16 RX ORDER — LISINOPRIL 20 MG/1
40 TABLET ORAL DAILY
Qty: 60 TABLET | Refills: 5 | Status: SHIPPED | OUTPATIENT
Start: 2024-07-16

## 2024-07-16 NOTE — TELEPHONE ENCOUNTER
Patient called to report her lisinopril was increased while she was in [facility] from 20 to 40 due to her blood pressure.  She reports great anxiety is keeping her blood pressure up.  She is requesting a new RX sent in because she is going to run out as she was instructed to take 2 of the 20's when she left [facility].   She has appt's with mental health regarding her anxiety.  She asks for a prescription for 20 mg to take 2 daily, in case her BP starts to run low she can just take one tablet-20 mg.  She needs to cancel upcoming appt due to having 6-7 weeks of radiation which will require her to isolate.  She will call office back to reschedule appt.

## 2024-11-19 RX ORDER — METOPROLOL SUCCINATE 25 MG/1
25 TABLET, EXTENDED RELEASE ORAL DAILY
Qty: 90 TABLET | Refills: 0 | Status: SHIPPED | OUTPATIENT
Start: 2024-11-19

## 2024-12-03 ENCOUNTER — TELEPHONE (OUTPATIENT)
Dept: CARDIOLOGY | Facility: CLINIC | Age: 64
End: 2024-12-03
Payer: COMMERCIAL

## 2024-12-03 RX ORDER — ASPIRIN 81 MG/1
81 TABLET, COATED ORAL DAILY
Qty: 90 TABLET | Refills: 3 | Status: SHIPPED | OUTPATIENT
Start: 2024-12-03

## 2024-12-03 RX ORDER — ROSUVASTATIN CALCIUM 20 MG/1
20 TABLET, COATED ORAL
Qty: 90 TABLET | Refills: 3 | Status: SHIPPED | OUTPATIENT
Start: 2024-12-03

## 2024-12-03 RX ORDER — CLOPIDOGREL BISULFATE 75 MG/1
75 TABLET ORAL DAILY
Qty: 90 TABLET | Refills: 3 | Status: SHIPPED | OUTPATIENT
Start: 2024-12-03

## 2024-12-03 NOTE — TELEPHONE ENCOUNTER
LEFT VOICEMAIL, CALLED TO RESCHEDULE MISSED FOLLOW UP APPOINTMENT WITH DR. BARAJAS FROM AUGUST. SCHEDULE WITH DR. BARAJAS OR ELLIOTT MONTAÑO.

## 2025-01-02 ENCOUNTER — TELEPHONE (OUTPATIENT)
Dept: CARDIOLOGY | Facility: CLINIC | Age: 65
End: 2025-01-02
Payer: COMMERCIAL

## 2025-01-02 RX ORDER — LISINOPRIL 20 MG/1
40 TABLET ORAL DAILY
Qty: 60 TABLET | Refills: 2 | Status: SHIPPED | OUTPATIENT
Start: 2025-01-02

## 2025-01-02 NOTE — TELEPHONE ENCOUNTER
Caller: Fatemeh Howard    Relationship: Self    Best call back number: [phone]     What is the best time to reach you:  ANYTIME    Who are you requesting to speak with (clinical staff, provider,  specific staff member):  NURSE     What was the call regarding:  PT IS CALLING BACK TO SPEAK WITH 'S NURSE REGARDING MEDICATIONS. SHE IS WONDERING ABOUT REFILLS SHE MAY NEED. PHARMACY IS HAVING TROUBLE WITH THE REFILL REQUESTS.     Is it okay if the provider responds through MyChart:  PLEASE CALL PT, THANK YOU!

## 2025-02-14 RX ORDER — METOPROLOL SUCCINATE 25 MG/1
25 TABLET, EXTENDED RELEASE ORAL DAILY
Qty: 90 TABLET | Refills: 0 | Status: SHIPPED | OUTPATIENT
Start: 2025-02-14

## 2025-03-11 NOTE — PROGRESS NOTES
Subjective:     Encounter Date:03/12/2025    Primary Care Physician: Silvana Murphy MD      Patient ID: Fatemeh Howard is a 64 y.o. female.    Chief Complaint:Follow-up (6 month )    PROBLEM LIST:  Coronary artery disease  11/2023 false STEMI 100%  medium caliber codominant RCA with mature left-to-right collaterals.80% codominant large circumflex (4 x 23 Xience BERENICE).  EF 55%.  Aortic stenosis with peak to peak gradient of 35 mmHg  Echocardiogram LVEF 45%, no significant AS.  (1/17/2024)  Dyslipidemia  Hypertension  Anxiety/depression  GERD  Primary adenocarcinoma of nasal cavity  Surgical incision 24  Facial reconstructive surgery 2024  Tobacco use, resolved 12/23  Surgeries:  Previous oral surgery (2000's) for oral cancer (adenocarcinoma)      No Known Allergies      Current Outpatient Medications:     albuterol sulfate  (90 Base) MCG/ACT inhaler, Inhale 2 puffs Every 4 (Four) Hours As Needed for Wheezing., Disp: , Rfl:     ALPRAZolam (XANAX) 0.5 MG tablet, TAKE 1 TABLET ORAL ROUTE EVERY 8 HOURS AS NEEDED, Disp: , Rfl:     Aspirin Low Dose 81 MG EC tablet, TAKE 1 TABLET BY MOUTH EVERY DAY, Disp: 90 tablet, Rfl: 3    busPIRone (BUSPAR) 10 MG tablet, Take 1 tablet by mouth 3 (Three) Times a Day., Disp: , Rfl:     Fluticasone-Salmeterol (ADVAIR/WIXELA) 100-50 MCG/ACT DISKUS, Inhale 1 puff 2 (Two) Times a Day As Needed., Disp: , Rfl:     lisinopril (PRINIVIL,ZESTRIL) 20 MG tablet, Take 2 tablets by mouth Daily., Disp: 60 tablet, Rfl: 2    metoprolol succinate XL (TOPROL-XL) 25 MG 24 hr tablet, TAKE 1 TABLET BY MOUTH EVERY DAY, Disp: 90 tablet, Rfl: 0    nitroglycerin (NITROSTAT) 0.4 MG SL tablet, Place 1 tablet under the tongue Every 5 (Five) Minutes As Needed for Chest Pain (Systolic BP Greater Than 100). Take no more than 3 doses in 15 minutes., Disp: 25 tablet, Rfl: 11    clopidogrel (PLAVIX) 75 MG tablet, TAKE 1 TABLET BY MOUTH EVERY DAY (Patient not taking: Reported on 3/12/2025), Disp: 90 tablet, Rfl:  "3    hydrOXYzine (ATARAX) 25 MG tablet, Take 1 tablet by mouth 3 (Three) Times a Day As Needed for Anxiety. FURTHER REFILLS FROM PCP (Patient not taking: Reported on 3/12/2025), Disp: 90 tablet, Rfl: 3    rosuvastatin (CRESTOR) 20 MG tablet, TAKE 1 TABLET BY MOUTH EVERY DAY AT NIGHT (Patient not taking: Reported on 3/12/2025), Disp: 90 tablet, Rfl: 3        History of Present Illness    Patient returns today for follow-up of coronary artery disease.  She has no cardiovascular complaints since her last visit a year ago.  He is no longer taking her cholesterol medicine because \"her levels were normal\".  She has severe anxiety disorder which she notes was before her surgery and been worse since her facial reconstructive surgery.  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  She has severe whitecoat hypertension, and notes her blood pressures at home are elevated but typically in the 150s-160s range.  Notes this is all due to anxiety.    The following portions of the patient's history were reviewed and updated as appropriate: allergies, current medications, past family history, past medical history, past social history, past surgical history and problem list.      Social History     Tobacco Use    Smoking status: Every Day     Current packs/day: 2.00     Types: Cigarettes    Smokeless tobacco: Current   Substance Use Topics    Alcohol use: Not Currently         ROS       Objective:   BP (!) 212/140   Pulse (!) 137   Ht 167.6 cm (66\")   Wt 101 kg (223 lb 9.6 oz)   SpO2 90%   BMI 36.09 kg/m²         Vitals reviewed.   Constitutional:       Appearance: Healthy appearance. Well-developed and not in distress.   Eyes:      Conjunctiva/sclera: Conjunctivae normal.      Pupils: Pupils are equal, round, and reactive to light.   HENT:      Head: Normocephalic and atraumatic.    Mouth/Throat:      Pharynx: Oropharynx is clear.   Neck:      Thyroid: Thyroid normal. No thyromegaly.     " " Vascular: Normal carotid pulses. No carotid bruit or JVD. JVD normal.      Lymphadenopathy: No cervical adenopathy.   Pulmonary:      Effort: No respiratory distress.      Breath sounds: No wheezing. No rales.   Chest:      Chest wall: Not tender to palpatation.   Cardiovascular:      Normal rate. Regular rhythm.      No gallop.    Pulses:     Carotid: 2+ bilaterally.     Dorsalis pedis: 2+ bilaterally.     Posterior tibial: 2+ bilaterally.  Edema:     Peripheral edema absent.   Abdominal:      General: There is no distension or abdominal bruit.      Palpations: There is no abdominal mass.      Tenderness: There is no abdominal tenderness. There is no rebound.   Musculoskeletal:         General: No tenderness or deformity.      Extremities: No clubbing present.Skin:     General: Skin is warm and dry. There is no cyanosis.      Findings: No rash.   Neurological:      Mental Status: Alert, oriented to person, place, and time and oriented to person, place and time.         Procedures          Assessment:   Assessment & Plan      Diagnoses and all orders for this visit:    1. Coronary artery disease involving native coronary artery of native heart without angina pectoris (Primary)  -     Comprehensive Metabolic Panel; Future  -     Lipid Panel; Future    2. Mixed hyperlipidemia  -     Comprehensive Metabolic Panel; Future  -     Lipid Panel; Future      1.  Coronary artery disease, 15 months post PCI.   of CVA, without angina.  2.  Dyslipidemia now off statin  3.  Hypertension.  Appears to have some baseline hypertension with severe whitecoat/anxiety component.  4.  Anxiety disorder  5.  Tobacco use resolved    Recommendations:  1.  Will recheck fasting lipid profile, discussed with patient she almost certainly needs statin reinstituted  2.  Recommended increasing beta-blocker for her anxiety induced hypertension.  Patient however does not want to do this, she knows the \"I do not want to take more medicine so it " "will cause me more anxiety\".  3.  Patient understands risk for severe hypertension.  Insists it is all due to anxiety and whitecoat component.  4.  Continue to encourage smoking cessation as he is doing  5.  Patient if she will not take further antihypertensives, given her severe anxiety, may need up titration of her benzodiazepine dose.  Will defer this to her primary psychiatrist.         Jeronimo Ray MD      Dictated utilizing Dragon dictation  "

## 2025-03-12 ENCOUNTER — OFFICE VISIT (OUTPATIENT)
Dept: CARDIOLOGY | Facility: CLINIC | Age: 65
End: 2025-03-12
Payer: COMMERCIAL

## 2025-03-12 VITALS
WEIGHT: 223.6 LBS | OXYGEN SATURATION: 90 % | DIASTOLIC BLOOD PRESSURE: 140 MMHG | SYSTOLIC BLOOD PRESSURE: 212 MMHG | BODY MASS INDEX: 35.93 KG/M2 | HEART RATE: 137 BPM | HEIGHT: 66 IN

## 2025-03-12 DIAGNOSIS — I25.10 CORONARY ARTERY DISEASE INVOLVING NATIVE CORONARY ARTERY OF NATIVE HEART WITHOUT ANGINA PECTORIS: Primary | ICD-10-CM

## 2025-03-12 DIAGNOSIS — E78.2 MIXED HYPERLIPIDEMIA: ICD-10-CM

## 2025-03-12 PROCEDURE — 1159F MED LIST DOCD IN RCRD: CPT | Performed by: INTERNAL MEDICINE

## 2025-03-12 PROCEDURE — 3080F DIAST BP >= 90 MM HG: CPT | Performed by: INTERNAL MEDICINE

## 2025-03-12 PROCEDURE — 3077F SYST BP >= 140 MM HG: CPT | Performed by: INTERNAL MEDICINE

## 2025-03-12 PROCEDURE — 99214 OFFICE O/P EST MOD 30 MIN: CPT | Performed by: INTERNAL MEDICINE

## 2025-03-12 PROCEDURE — 1160F RVW MEDS BY RX/DR IN RCRD: CPT | Performed by: INTERNAL MEDICINE

## 2025-03-12 RX ORDER — BUSPIRONE HYDROCHLORIDE 10 MG/1
10 TABLET ORAL 3 TIMES DAILY
COMMUNITY

## 2025-03-13 RX ORDER — NITROGLYCERIN 0.4 MG/1
0.4 TABLET SUBLINGUAL
Qty: 25 TABLET | Refills: 5 | Status: SHIPPED | OUTPATIENT
Start: 2025-03-13

## 2025-03-13 RX ORDER — LISINOPRIL 20 MG/1
40 TABLET ORAL DAILY
Qty: 60 TABLET | Refills: 11 | Status: SHIPPED | OUTPATIENT
Start: 2025-03-13

## 2025-03-13 RX ORDER — ASPIRIN 81 MG/1
81 TABLET ORAL DAILY
Qty: 90 TABLET | Refills: 3 | Status: SHIPPED | OUTPATIENT
Start: 2025-03-13

## 2025-04-30 ENCOUNTER — TRANSCRIBE ORDERS (OUTPATIENT)
Dept: LAB | Facility: HOSPITAL | Age: 65
End: 2025-04-30
Payer: MEDICARE

## 2025-04-30 ENCOUNTER — LAB (OUTPATIENT)
Dept: LAB | Facility: HOSPITAL | Age: 65
End: 2025-04-30
Payer: MEDICARE

## 2025-04-30 DIAGNOSIS — Z79.890 NEED FOR PROPHYLACTIC HORMONE REPLACEMENT THERAPY (POSTMENOPAUSAL): Primary | ICD-10-CM

## 2025-04-30 DIAGNOSIS — I25.10 CORONARY ARTERY DISEASE INVOLVING NATIVE CORONARY ARTERY OF NATIVE HEART WITHOUT ANGINA PECTORIS: ICD-10-CM

## 2025-04-30 DIAGNOSIS — E78.5 HYPERLIPIDEMIA, UNSPECIFIED HYPERLIPIDEMIA TYPE: ICD-10-CM

## 2025-04-30 DIAGNOSIS — E78.2 MIXED HYPERLIPIDEMIA: ICD-10-CM

## 2025-04-30 LAB
ALBUMIN SERPL-MCNC: 4.3 G/DL (ref 3.5–5.2)
ALBUMIN/GLOB SERPL: 1.4 G/DL
ALP SERPL-CCNC: 95 U/L (ref 39–117)
ALT SERPL W P-5'-P-CCNC: 21 U/L (ref 1–33)
ANION GAP SERPL CALCULATED.3IONS-SCNC: 11.8 MMOL/L (ref 5–15)
AST SERPL-CCNC: 24 U/L (ref 1–32)
BILIRUB SERPL-MCNC: 0.7 MG/DL (ref 0–1.2)
BUN SERPL-MCNC: 11 MG/DL (ref 8–23)
BUN/CREAT SERPL: 18.3 (ref 7–25)
CALCIUM SPEC-SCNC: 9.4 MG/DL (ref 8.6–10.5)
CHLORIDE SERPL-SCNC: 104 MMOL/L (ref 98–107)
CHOLEST SERPL-MCNC: 206 MG/DL (ref 0–200)
CO2 SERPL-SCNC: 23.2 MMOL/L (ref 22–29)
CREAT SERPL-MCNC: 0.6 MG/DL (ref 0.57–1)
EGFRCR SERPLBLD CKD-EPI 2021: 99.8 ML/MIN/1.73
GLOBULIN UR ELPH-MCNC: 3 GM/DL
GLUCOSE SERPL-MCNC: 106 MG/DL (ref 65–99)
HDLC SERPL-MCNC: 29 MG/DL (ref 40–60)
LDLC SERPL CALC-MCNC: 124 MG/DL (ref 0–100)
LDLC/HDLC SERPL: 4.06 {RATIO}
POTASSIUM SERPL-SCNC: 4 MMOL/L (ref 3.5–5.2)
PROT SERPL-MCNC: 7.3 G/DL (ref 6–8.5)
SODIUM SERPL-SCNC: 139 MMOL/L (ref 136–145)
TRIGL SERPL-MCNC: 296 MG/DL (ref 0–150)
VLDLC SERPL-MCNC: 53 MG/DL (ref 5–40)

## 2025-04-30 PROCEDURE — 80053 COMPREHEN METABOLIC PANEL: CPT

## 2025-04-30 PROCEDURE — 80061 LIPID PANEL: CPT

## 2025-04-30 PROCEDURE — 36415 COLL VENOUS BLD VENIPUNCTURE: CPT

## 2025-05-01 ENCOUNTER — RESULTS FOLLOW-UP (OUTPATIENT)
Dept: CARDIOLOGY | Facility: CLINIC | Age: 65
End: 2025-05-01
Payer: MEDICARE

## 2025-05-01 DIAGNOSIS — E78.2 MIXED HYPERLIPIDEMIA: Primary | ICD-10-CM

## 2025-05-02 RX ORDER — ROSUVASTATIN CALCIUM 5 MG/1
5 TABLET, COATED ORAL NIGHTLY
Qty: 30 TABLET | Refills: 11 | Status: SHIPPED | OUTPATIENT
Start: 2025-05-02

## 2025-05-02 NOTE — TELEPHONE ENCOUNTER
Called patient and informed her of results and recommendations per Dr. Ray. Patient verbalizes understanding and is agreeable to starting rosuvastatin 5 mg nightly. No additional questions.

## 2025-05-06 RX ORDER — METOPROLOL SUCCINATE 25 MG/1
25 TABLET, EXTENDED RELEASE ORAL DAILY
Qty: 90 TABLET | Refills: 0 | Status: SHIPPED | OUTPATIENT
Start: 2025-05-06

## 2025-08-11 RX ORDER — METOPROLOL SUCCINATE 25 MG/1
25 TABLET, EXTENDED RELEASE ORAL DAILY
Qty: 90 TABLET | Refills: 3 | Status: SHIPPED | OUTPATIENT
Start: 2025-08-11

## (undated) DEVICE — FINECROSS MG CORONARY MICRO-GUIDE CATHETER: Brand: FINECROSS

## (undated) DEVICE — MODEL BT2000 P/N 700287-012KIT CONTENTS: MANIFOLD WITH SALINE AND CONTRAST PORTS, SALINE TUBING WITH SPIKE AND HAND SYRINGE, TRANSDUCER: Brand: BT2000 AUTOMATED MANIFOLD KIT

## (undated) DEVICE — GW PT 2 MS .014 185CM STR TP

## (undated) DEVICE — CATH DIAG EXPO M/ PK 5F FL4/FR4 PIG

## (undated) DEVICE — ADULT, W/LG. BACK PAD, RADIOTRANSPARENT ELEMENT AND LEAD WIRE COMPATIBLE W/: Brand: DEFIBRILLATION ELECTRODES

## (undated) DEVICE — NC TREK NEO™ CORONARY DILATATION CATHETER 4.00 MM X 12 MM / RAPID-EXCHANGE: Brand: NC TREK NEO™

## (undated) DEVICE — ST INF PRI SMRTSTE 20DRP 2VLV 24ML 117

## (undated) DEVICE — CATH DIAG EXPO .045 FL3  5F 100CM

## (undated) DEVICE — MODEL AT P65, P/N 701554-001KIT CONTENTS: HAND CONTROLLER, 3-WAY HIGH-PRESSURE STOPCOCK WITH ROTATING END AND PREMIUM HIGH-PRESSURE TUBING: Brand: ANGIOTOUCH® KIT

## (undated) DEVICE — ST INF 2NDARY 20DRP VNT/NOVNT 30IN

## (undated) DEVICE — GUIDE CATHETER: Brand: MACH1™

## (undated) DEVICE — GW PERIPH GUIDERIGHT STD/EXCHNG/J/TIP SS 0.035IN 5X260CM

## (undated) DEVICE — PK CATH CARD 10

## (undated) DEVICE — CVR PROB ULTRASND/TRANSD W/GEL 7X11IN STRL

## (undated) DEVICE — NC TREK NEO™ CORONARY DILATATION CATHETER 3.00 MM X 12 MM / RAPID-EXCHANGE: Brand: NC TREK NEO™

## (undated) DEVICE — Device: Brand: ASAHI SION BLUE

## (undated) DEVICE — DEV INFL MONARCH 25W

## (undated) DEVICE — PINNACLE INTRODUCER SHEATH: Brand: PINNACLE

## (undated) DEVICE — GLIDESHEATH SLENDER STAINLESS STEEL KIT: Brand: GLIDESHEATH SLENDER

## (undated) DEVICE — NC TREK NEO™ CORONARY DILATATION CATHETER 2.00 MM X 12 MM / RAPID-EXCHANGE: Brand: NC TREK NEO™

## (undated) DEVICE — GUIDELINER CATHETERS ARE INTENDED TO BE USED IN CONJUNCTION WITH GUIDE CATHETERS TO ACCESS DISCRETE REGIONS OF THE CORONARY AND/OR PERIPHERAL VASCULATURE, AND TO FACILITATE PLACEMENT OF INTERVENTIONAL DEVICES.: Brand: GUIDELINER® V3 CATHETER

## (undated) DEVICE — ST ACC MICROPUNCTURE .018 TRANSLSS/SS/TP 5F/10CM 21G/7CM

## (undated) DEVICE — ANGIO-SEAL VIP VASCULAR CLOSURE DEVICE: Brand: ANGIO-SEAL